# Patient Record
Sex: FEMALE | Race: WHITE | NOT HISPANIC OR LATINO | Employment: OTHER | URBAN - NONMETROPOLITAN AREA
[De-identification: names, ages, dates, MRNs, and addresses within clinical notes are randomized per-mention and may not be internally consistent; named-entity substitution may affect disease eponyms.]

---

## 2017-01-01 ENCOUNTER — HOSPITAL ENCOUNTER (OUTPATIENT)
Dept: CT IMAGING | Facility: HOSPITAL | Age: 72
Discharge: HOME | End: 2017-09-20
Attending: INTERNAL MEDICINE | Admitting: INTERNAL MEDICINE

## 2017-01-01 ENCOUNTER — APPOINTMENT (OUTPATIENT)
Dept: CT IMAGING | Facility: HOSPITAL | Age: 72
End: 2017-01-01
Attending: INTERNAL MEDICINE

## 2017-01-01 ENCOUNTER — HOSPITAL ENCOUNTER (OUTPATIENT)
Dept: CT IMAGING | Facility: HOSPITAL | Age: 72
Discharge: HOME | End: 2017-03-14
Attending: INTERNAL MEDICINE | Admitting: INTERNAL MEDICINE

## 2017-01-01 ENCOUNTER — APPOINTMENT (OUTPATIENT)
Dept: CT IMAGING | Facility: HOSPITAL | Age: 72
End: 2017-01-01

## 2017-01-01 ENCOUNTER — HOSPITAL ENCOUNTER (OUTPATIENT)
Dept: CT IMAGING | Facility: HOSPITAL | Age: 72
Discharge: HOME | End: 2017-01-13
Attending: INTERNAL MEDICINE | Admitting: INTERNAL MEDICINE

## 2017-01-01 ENCOUNTER — OFFICE VISIT (OUTPATIENT)
Dept: ONCOLOGY | Facility: CLINIC | Age: 72
End: 2017-01-01

## 2017-01-01 ENCOUNTER — HOSPITAL ENCOUNTER (OUTPATIENT)
Facility: HOSPITAL | Age: 72
Setting detail: HOSPITAL OUTPATIENT SURGERY
End: 2017-01-01
Attending: SURGERY | Admitting: SURGERY

## 2017-01-01 ENCOUNTER — HOSPITAL ENCOUNTER (OUTPATIENT)
Dept: CT IMAGING | Facility: HOSPITAL | Age: 72
Discharge: HOME | End: 2017-09-20
Attending: INTERNAL MEDICINE

## 2017-01-01 ENCOUNTER — OFFICE VISIT (OUTPATIENT)
Dept: SURGERY | Facility: CLINIC | Age: 72
End: 2017-01-01

## 2017-01-01 ENCOUNTER — HOSPITAL ENCOUNTER (EMERGENCY)
Facility: HOSPITAL | Age: 72
End: 2017-01-01

## 2017-01-01 ENCOUNTER — LAB (OUTPATIENT)
Dept: ONCOLOGY | Facility: CLINIC | Age: 72
End: 2017-01-01

## 2017-01-01 ENCOUNTER — HOSPITAL ENCOUNTER (OUTPATIENT)
Dept: CT IMAGING | Facility: HOSPITAL | Age: 72
Discharge: HOME | End: 2017-01-13
Attending: INTERNAL MEDICINE

## 2017-01-01 ENCOUNTER — DOCUMENTATION (OUTPATIENT)
Dept: ONCOLOGY | Facility: HOSPITAL | Age: 72
End: 2017-01-01

## 2017-01-01 ENCOUNTER — HOSPITAL ENCOUNTER (EMERGENCY)
Facility: HOSPITAL | Age: 72
Discharge: HOME | End: 2017-10-27
Attending: EMERGENCY MEDICINE | Admitting: EMERGENCY MEDICINE

## 2017-01-01 ENCOUNTER — HOSPITAL ENCOUNTER (OUTPATIENT)
Dept: CT IMAGING | Facility: HOSPITAL | Age: 72
Discharge: HOME | End: 2017-06-07
Attending: INTERNAL MEDICINE | Admitting: INTERNAL MEDICINE

## 2017-01-01 ENCOUNTER — HOSPITAL ENCOUNTER (OUTPATIENT)
Dept: CT IMAGING | Facility: HOSPITAL | Age: 72
Discharge: HOME | End: 2017-06-07
Attending: INTERNAL MEDICINE

## 2017-01-01 ENCOUNTER — HOSPITAL ENCOUNTER (INPATIENT)
Facility: HOSPITAL | Age: 72
LOS: 1 days | Discharge: HOSPICE | End: 2017-11-04
Attending: FAMILY MEDICINE | Admitting: FAMILY MEDICINE

## 2017-01-01 ENCOUNTER — HOSPITAL ENCOUNTER (OUTPATIENT)
Dept: CT IMAGING | Facility: HOSPITAL | Age: 72
Discharge: HOME | End: 2017-03-14
Attending: INTERNAL MEDICINE

## 2017-01-01 VITALS
WEIGHT: 135.6 LBS | RESPIRATION RATE: 18 BRPM | HEART RATE: 118 BPM | SYSTOLIC BLOOD PRESSURE: 110 MMHG | BODY MASS INDEX: 26.48 KG/M2 | DIASTOLIC BLOOD PRESSURE: 79 MMHG | TEMPERATURE: 98.2 F | OXYGEN SATURATION: 89 %

## 2017-01-01 VITALS
RESPIRATION RATE: 18 BRPM | OXYGEN SATURATION: 97 % | TEMPERATURE: 97 F | WEIGHT: 151.5 LBS | BODY MASS INDEX: 28.16 KG/M2 | SYSTOLIC BLOOD PRESSURE: 145 MMHG | HEART RATE: 105 BPM | DIASTOLIC BLOOD PRESSURE: 82 MMHG

## 2017-01-01 VITALS
TEMPERATURE: 98.2 F | OXYGEN SATURATION: 99 % | SYSTOLIC BLOOD PRESSURE: 110 MMHG | HEART RATE: 82 BPM | HEIGHT: 63 IN | WEIGHT: 120 LBS | DIASTOLIC BLOOD PRESSURE: 66 MMHG | BODY MASS INDEX: 21.26 KG/M2 | RESPIRATION RATE: 16 BRPM

## 2017-01-01 VITALS
OXYGEN SATURATION: 96 % | TEMPERATURE: 98.2 F | WEIGHT: 136.6 LBS | RESPIRATION RATE: 18 BRPM | BODY MASS INDEX: 25.39 KG/M2 | HEART RATE: 111 BPM | SYSTOLIC BLOOD PRESSURE: 107 MMHG | DIASTOLIC BLOOD PRESSURE: 62 MMHG

## 2017-01-01 VITALS
SYSTOLIC BLOOD PRESSURE: 137 MMHG | WEIGHT: 143 LBS | HEART RATE: 94 BPM | DIASTOLIC BLOOD PRESSURE: 69 MMHG | HEIGHT: 61 IN | BODY MASS INDEX: 27 KG/M2

## 2017-01-01 VITALS
WEIGHT: 143 LBS | SYSTOLIC BLOOD PRESSURE: 146 MMHG | BODY MASS INDEX: 26.58 KG/M2 | RESPIRATION RATE: 18 BRPM | DIASTOLIC BLOOD PRESSURE: 71 MMHG | TEMPERATURE: 99 F | HEART RATE: 95 BPM | OXYGEN SATURATION: 97 %

## 2017-01-01 VITALS
HEART RATE: 103 BPM | DIASTOLIC BLOOD PRESSURE: 68 MMHG | WEIGHT: 146 LBS | TEMPERATURE: 98.6 F | RESPIRATION RATE: 20 BRPM | BODY MASS INDEX: 27.14 KG/M2 | OXYGEN SATURATION: 97 % | SYSTOLIC BLOOD PRESSURE: 111 MMHG

## 2017-01-01 VITALS
RESPIRATION RATE: 16 BRPM | WEIGHT: 145.8 LBS | SYSTOLIC BLOOD PRESSURE: 118 MMHG | DIASTOLIC BLOOD PRESSURE: 68 MMHG | TEMPERATURE: 98.5 F | BODY MASS INDEX: 27.1 KG/M2 | HEART RATE: 116 BPM | OXYGEN SATURATION: 96 %

## 2017-01-01 VITALS
HEIGHT: 60 IN | SYSTOLIC BLOOD PRESSURE: 128 MMHG | BODY MASS INDEX: 28.47 KG/M2 | TEMPERATURE: 98.2 F | DIASTOLIC BLOOD PRESSURE: 73 MMHG | RESPIRATION RATE: 18 BRPM | WEIGHT: 145 LBS | OXYGEN SATURATION: 99 % | HEART RATE: 97 BPM

## 2017-01-01 DIAGNOSIS — C18.9 COLON ADENOCARCINOMA (HCC): ICD-10-CM

## 2017-01-01 DIAGNOSIS — C18.9 MALIGNANT NEOPLASM OF COLON, UNSPECIFIED PART OF COLON (HCC): ICD-10-CM

## 2017-01-01 DIAGNOSIS — C18.9 COLON ADENOCARCINOMA (HCC): Primary | ICD-10-CM

## 2017-01-01 DIAGNOSIS — C18.9 MALIGNANT NEOPLASM OF COLON, UNSPECIFIED PART OF COLON (HCC): Primary | ICD-10-CM

## 2017-01-01 DIAGNOSIS — Z51.5 HOSPICE CARE PATIENT: ICD-10-CM

## 2017-01-01 DIAGNOSIS — N39.0 ACUTE UTI: Primary | ICD-10-CM

## 2017-01-01 LAB
ALBUMIN SERPL-MCNC: 3.8 G/DL (ref 3.4–4.8)
ALBUMIN SERPL-MCNC: 4.1 G/DL (ref 3.4–4.8)
ALBUMIN SERPL-MCNC: 4.1 G/DL (ref 3.4–4.8)
ALBUMIN SERPL-MCNC: 4.7 G/DL (ref 3.4–4.8)
ALBUMIN/GLOB SERPL: 1.1 G/DL (ref 1.5–2.5)
ALBUMIN/GLOB SERPL: 1.2 G/DL (ref 1.5–2.5)
ALBUMIN/GLOB SERPL: 1.3 G/DL (ref 1.5–2.5)
ALBUMIN/GLOB SERPL: 1.4 G/DL (ref 1.5–2.5)
ALP SERPL-CCNC: 67 U/L (ref 35–104)
ALP SERPL-CCNC: 79 U/L (ref 35–104)
ALP SERPL-CCNC: 80 U/L (ref 46–116)
ALP SERPL-CCNC: 86 U/L (ref 35–104)
ALT SERPL W P-5'-P-CCNC: 14 U/L (ref 10–36)
ALT SERPL W P-5'-P-CCNC: 15 U/L (ref 10–36)
ALT SERPL W P-5'-P-CCNC: 20 U/L (ref 10–36)
ALT SERPL W P-5'-P-CCNC: 20 U/L (ref 10–36)
ANION GAP SERPL CALCULATED.3IONS-SCNC: 10.9 MMOL/L (ref 3.6–11.2)
ANION GAP SERPL CALCULATED.3IONS-SCNC: 13.1 MMOL/L (ref 3.6–11.2)
ANION GAP SERPL CALCULATED.3IONS-SCNC: 6.2 MMOL/L (ref 3.6–11.2)
ANION GAP SERPL CALCULATED.3IONS-SCNC: 6.6 MMOL/L (ref 3.6–11.2)
AST SERPL-CCNC: 26 U/L (ref 10–30)
AST SERPL-CCNC: 26 U/L (ref 10–30)
AST SERPL-CCNC: 28 U/L (ref 10–30)
AST SERPL-CCNC: 30 U/L (ref 10–30)
BACTERIA SPEC AEROBE CULT: NORMAL
BACTERIA UR QL AUTO: ABNORMAL /HPF
BASOPHILS # BLD AUTO: 0.03 10*3/MM3 (ref 0–0.3)
BASOPHILS # BLD AUTO: 0.05 10*3/MM3 (ref 0–0.3)
BASOPHILS NFR BLD AUTO: 0.1 % (ref 0–2)
BASOPHILS NFR BLD AUTO: 0.2 % (ref 0–2)
BASOPHILS NFR BLD AUTO: 0.3 % (ref 0–2)
BASOPHILS NFR BLD AUTO: 0.5 % (ref 0–2)
BILIRUB SERPL-MCNC: 0.3 MG/DL (ref 0.2–1.8)
BILIRUB SERPL-MCNC: 0.3 MG/DL (ref 0.2–1.8)
BILIRUB SERPL-MCNC: 0.5 MG/DL (ref 0.2–1.8)
BILIRUB SERPL-MCNC: 0.6 MG/DL (ref 0.2–1.8)
BILIRUB UR QL STRIP: ABNORMAL
BUN BLD-MCNC: 11 MG/DL (ref 7–21)
BUN BLD-MCNC: 15 MG/DL (ref 7–21)
BUN BLD-MCNC: 8 MG/DL (ref 7–21)
BUN BLD-MCNC: 8 MG/DL (ref 7–21)
BUN/CREAT SERPL: 6.9 (ref 7–25)
BUN/CREAT SERPL: 8.6 (ref 7–25)
BUN/CREAT SERPL: 9.3 (ref 7–25)
BUN/CREAT SERPL: 9.6 (ref 7–25)
CALCIUM SPEC-SCNC: 10 MG/DL (ref 7.7–10)
CALCIUM SPEC-SCNC: 10.6 MG/DL (ref 7.7–10)
CALCIUM SPEC-SCNC: 10.9 MG/DL (ref 7.7–10)
CALCIUM SPEC-SCNC: 9.2 MG/DL (ref 7.7–10)
CEA SERPL-MCNC: 1.4 NG/ML (ref 0–5)
CEA SERPL-MCNC: 1.4 NG/ML (ref 0–5)
CEA SERPL-MCNC: 2 NG/ML (ref 0–5)
CHLORIDE SERPL-SCNC: 100 MMOL/L (ref 99–112)
CHLORIDE SERPL-SCNC: 104 MMOL/L (ref 99–112)
CHLORIDE SERPL-SCNC: 106 MMOL/L (ref 99–112)
CHLORIDE SERPL-SCNC: 108 MMOL/L (ref 99–112)
CLARITY UR: ABNORMAL
CO2 SERPL-SCNC: 21.9 MMOL/L (ref 24.3–31.9)
CO2 SERPL-SCNC: 27.8 MMOL/L (ref 24.3–31.9)
CO2 SERPL-SCNC: 28.1 MMOL/L (ref 24.3–31.9)
CO2 SERPL-SCNC: 30.4 MMOL/L (ref 24.3–31.9)
COLOR UR: ABNORMAL
CREAT BLD-MCNC: 0.93 MG/DL (ref 0.43–1.29)
CREAT BLD-MCNC: 1.14 MG/DL (ref 0.43–1.29)
CREAT BLD-MCNC: 1.16 MG/DL (ref 0.43–1.29)
CREAT BLD-MCNC: 1.61 MG/DL (ref 0.43–1.29)
CREAT BLDA-MCNC: 1 MG/DL (ref 0.6–1.3)
CREAT BLDA-MCNC: 1.1 MG/DL (ref 0.6–1.3)
CREAT BLDA-MCNC: 1.1 MG/DL (ref 0.6–1.3)
CREAT BLDA-MCNC: 1.2 MG/DL (ref 0.6–1.3)
D-LACTATE SERPL-SCNC: 2 MMOL/L (ref 0.5–2)
DEPRECATED RDW RBC AUTO: 46.6 FL (ref 37–54)
DEPRECATED RDW RBC AUTO: 49.1 FL (ref 37–54)
DEPRECATED RDW RBC AUTO: 53.3 FL (ref 37–54)
DEPRECATED RDW RBC AUTO: 62.1 FL (ref 37–54)
EOSINOPHIL # BLD AUTO: 0.07 10*3/MM3 (ref 0–0.7)
EOSINOPHIL # BLD AUTO: 0.18 10*3/MM3 (ref 0–0.7)
EOSINOPHIL # BLD AUTO: 0.26 10*3/MM3 (ref 0–0.7)
EOSINOPHIL # BLD AUTO: 0.28 10*3/MM3 (ref 0–0.7)
EOSINOPHIL NFR BLD AUTO: 0.3 % (ref 0–7)
EOSINOPHIL NFR BLD AUTO: 1.8 % (ref 0–7)
EOSINOPHIL NFR BLD AUTO: 2.1 % (ref 0–7)
EOSINOPHIL NFR BLD AUTO: 2.9 % (ref 0–7)
ERYTHROCYTE [DISTWIDTH] IN BLOOD BY AUTOMATED COUNT: 15.7 % (ref 11.5–14.5)
ERYTHROCYTE [DISTWIDTH] IN BLOOD BY AUTOMATED COUNT: 16.1 % (ref 11.5–14.5)
ERYTHROCYTE [DISTWIDTH] IN BLOOD BY AUTOMATED COUNT: 17.8 % (ref 11.5–14.5)
ERYTHROCYTE [DISTWIDTH] IN BLOOD BY AUTOMATED COUNT: 23.1 % (ref 11.5–14.5)
GFR SERPL CREATININE-BSD FRML MDRD: 31 ML/MIN/1.73
GFR SERPL CREATININE-BSD FRML MDRD: 46 ML/MIN/1.73
GFR SERPL CREATININE-BSD FRML MDRD: 47 ML/MIN/1.73
GFR SERPL CREATININE-BSD FRML MDRD: 59 ML/MIN/1.73
GLOBULIN UR ELPH-MCNC: 2.9 GM/DL
GLOBULIN UR ELPH-MCNC: 3.4 GM/DL
GLOBULIN UR ELPH-MCNC: 3.4 GM/DL
GLOBULIN UR ELPH-MCNC: 3.5 GM/DL
GLUCOSE BLD-MCNC: 123 MG/DL (ref 70–110)
GLUCOSE BLD-MCNC: 176 MG/DL (ref 70–110)
GLUCOSE BLD-MCNC: 198 MG/DL (ref 70–110)
GLUCOSE BLD-MCNC: 209 MG/DL (ref 70–110)
GLUCOSE UR STRIP-MCNC: NEGATIVE MG/DL
HCT VFR BLD AUTO: 39 % (ref 37–47)
HCT VFR BLD AUTO: 39.1 % (ref 37–47)
HCT VFR BLD AUTO: 40.8 % (ref 37–47)
HCT VFR BLD AUTO: 41.4 % (ref 37–47)
HGB BLD-MCNC: 12.1 G/DL (ref 12–16)
HGB BLD-MCNC: 12.2 G/DL (ref 12–16)
HGB BLD-MCNC: 12.4 G/DL (ref 12–16)
HGB BLD-MCNC: 12.5 G/DL (ref 12–16)
HGB UR QL STRIP.AUTO: ABNORMAL
HYALINE CASTS UR QL AUTO: ABNORMAL /LPF
IMM GRANULOCYTES # BLD: 0.01 10*3/MM3 (ref 0–0.03)
IMM GRANULOCYTES # BLD: 0.02 10*3/MM3 (ref 0–0.03)
IMM GRANULOCYTES # BLD: 0.02 10*3/MM3 (ref 0–0.03)
IMM GRANULOCYTES # BLD: 0.1 10*3/MM3 (ref 0–0.03)
IMM GRANULOCYTES NFR BLD: 0.1 % (ref 0–0.5)
IMM GRANULOCYTES NFR BLD: 0.2 % (ref 0–0.5)
IMM GRANULOCYTES NFR BLD: 0.2 % (ref 0–0.5)
IMM GRANULOCYTES NFR BLD: 0.4 % (ref 0–0.5)
KETONES UR QL STRIP: ABNORMAL
LEUKOCYTE ESTERASE UR QL STRIP.AUTO: ABNORMAL
LIPASE SERPL-CCNC: 103 U/L (ref 13–60)
LYMPHOCYTES # BLD AUTO: 1.93 10*3/MM3 (ref 1–3)
LYMPHOCYTES # BLD AUTO: 2.67 10*3/MM3 (ref 1–3)
LYMPHOCYTES # BLD AUTO: 2.69 10*3/MM3 (ref 1–3)
LYMPHOCYTES # BLD AUTO: 2.93 10*3/MM3 (ref 1–3)
LYMPHOCYTES NFR BLD AUTO: 24.2 % (ref 16–46)
LYMPHOCYTES NFR BLD AUTO: 26.5 % (ref 16–46)
LYMPHOCYTES NFR BLD AUTO: 27.9 % (ref 16–46)
LYMPHOCYTES NFR BLD AUTO: 8.5 % (ref 16–46)
MCH RBC QN AUTO: 23.3 PG (ref 27–33)
MCH RBC QN AUTO: 25.4 PG (ref 27–33)
MCH RBC QN AUTO: 25.6 PG (ref 27–33)
MCH RBC QN AUTO: 26.3 PG (ref 27–33)
MCHC RBC AUTO-ENTMCNC: 30.2 G/DL (ref 33–37)
MCHC RBC AUTO-ENTMCNC: 30.4 G/DL (ref 33–37)
MCHC RBC AUTO-ENTMCNC: 31 G/DL (ref 33–37)
MCHC RBC AUTO-ENTMCNC: 31.2 G/DL (ref 33–37)
MCV RBC AUTO: 77.1 FL (ref 80–94)
MCV RBC AUTO: 81.8 FL (ref 80–94)
MCV RBC AUTO: 84.1 FL (ref 80–94)
MCV RBC AUTO: 84.4 FL (ref 80–94)
MONOCYTES # BLD AUTO: 0.83 10*3/MM3 (ref 0.1–0.9)
MONOCYTES # BLD AUTO: 0.9 10*3/MM3 (ref 0.1–0.9)
MONOCYTES # BLD AUTO: 1.16 10*3/MM3 (ref 0.1–0.9)
MONOCYTES # BLD AUTO: 2.27 10*3/MM3 (ref 0.1–0.9)
MONOCYTES NFR BLD AUTO: 10.1 % (ref 0–12)
MONOCYTES NFR BLD AUTO: 11.5 % (ref 0–12)
MONOCYTES NFR BLD AUTO: 6.9 % (ref 0–12)
MONOCYTES NFR BLD AUTO: 9.3 % (ref 0–12)
NEUTROPHILS # BLD AUTO: 18.18 10*3/MM3 (ref 1.4–6.5)
NEUTROPHILS # BLD AUTO: 5.71 10*3/MM3 (ref 1.4–6.5)
NEUTROPHILS # BLD AUTO: 6 10*3/MM3 (ref 1.4–6.5)
NEUTROPHILS # BLD AUTO: 8.05 10*3/MM3 (ref 1.4–6.5)
NEUTROPHILS NFR BLD AUTO: 59.4 % (ref 40–75)
NEUTROPHILS NFR BLD AUTO: 59.5 % (ref 40–75)
NEUTROPHILS NFR BLD AUTO: 66.5 % (ref 40–75)
NEUTROPHILS NFR BLD AUTO: 80.6 % (ref 40–75)
NITRITE UR QL STRIP: NEGATIVE
OSMOLALITY SERPL CALC.SUM OF ELEC: 276.5 MOSM/KG (ref 273–305)
OSMOLALITY SERPL CALC.SUM OF ELEC: 282.8 MOSM/KG (ref 273–305)
OSMOLALITY SERPL CALC.SUM OF ELEC: 283.9 MOSM/KG (ref 273–305)
OSMOLALITY SERPL CALC.SUM OF ELEC: 294.2 MOSM/KG (ref 273–305)
PH UR STRIP.AUTO: <=5 [PH] (ref 5–8)
PLATELET # BLD AUTO: 299 10*3/MM3 (ref 130–400)
PLATELET # BLD AUTO: 314 10*3/MM3 (ref 130–400)
PLATELET # BLD AUTO: 343 10*3/MM3 (ref 130–400)
PLATELET # BLD AUTO: 529 10*3/MM3 (ref 130–400)
PMV BLD AUTO: 10.1 FL (ref 6–10)
PMV BLD AUTO: 10.3 FL (ref 6–10)
PMV BLD AUTO: 10.5 FL (ref 6–10)
PMV BLD AUTO: 9.7 FL (ref 6–10)
POTASSIUM BLD-SCNC: 3.5 MMOL/L (ref 3.5–5.3)
POTASSIUM BLD-SCNC: 3.6 MMOL/L (ref 3.5–5.3)
POTASSIUM BLD-SCNC: 3.8 MMOL/L (ref 3.5–5.3)
POTASSIUM BLD-SCNC: 3.9 MMOL/L (ref 3.5–5.3)
PROT SERPL-MCNC: 7 G/DL (ref 6–8)
PROT SERPL-MCNC: 7.2 G/DL (ref 6–8)
PROT SERPL-MCNC: 7.5 G/DL (ref 6–8)
PROT SERPL-MCNC: 8.2 G/DL (ref 6–8)
PROT UR QL STRIP: ABNORMAL
RBC # BLD AUTO: 4.63 10*6/MM3 (ref 4.2–5.4)
RBC # BLD AUTO: 4.77 10*6/MM3 (ref 4.2–5.4)
RBC # BLD AUTO: 4.85 10*6/MM3 (ref 4.2–5.4)
RBC # BLD AUTO: 5.37 10*6/MM3 (ref 4.2–5.4)
RBC # UR: ABNORMAL /HPF
REF LAB TEST METHOD: ABNORMAL
SODIUM BLD-SCNC: 137 MMOL/L (ref 135–153)
SODIUM BLD-SCNC: 139 MMOL/L (ref 135–153)
SODIUM BLD-SCNC: 142 MMOL/L (ref 135–153)
SODIUM BLD-SCNC: 145 MMOL/L (ref 135–153)
SP GR UR STRIP: 1.01 (ref 1–1.03)
SQUAMOUS #/AREA URNS HPF: ABNORMAL /HPF
UROBILINOGEN UR QL STRIP: ABNORMAL
WBC NRBC COR # BLD: 10.08 10*3/MM3 (ref 4.5–12.5)
WBC NRBC COR # BLD: 12.11 10*3/MM3 (ref 4.5–12.5)
WBC NRBC COR # BLD: 22.58 10*3/MM3 (ref 4.5–12.5)
WBC NRBC COR # BLD: 9.63 10*3/MM3 (ref 4.5–12.5)
WBC UR QL AUTO: ABNORMAL /HPF

## 2017-01-01 PROCEDURE — 0 IOPAMIDOL 61 % SOLUTION: Performed by: INTERNAL MEDICINE

## 2017-01-01 PROCEDURE — 74177 CT ABD & PELVIS W/CONTRAST: CPT

## 2017-01-01 PROCEDURE — 74177 CT ABD & PELVIS W/CONTRAST: CPT | Performed by: RADIOLOGY

## 2017-01-01 PROCEDURE — 99214 OFFICE O/P EST MOD 30 MIN: CPT | Performed by: INTERNAL MEDICINE

## 2017-01-01 PROCEDURE — 82565 ASSAY OF CREATININE: CPT

## 2017-01-01 PROCEDURE — 71260 CT THORAX DX C+: CPT

## 2017-01-01 PROCEDURE — 83605 ASSAY OF LACTIC ACID: CPT | Performed by: EMERGENCY MEDICINE

## 2017-01-01 PROCEDURE — 36415 COLL VENOUS BLD VENIPUNCTURE: CPT | Performed by: INTERNAL MEDICINE

## 2017-01-01 PROCEDURE — 82378 CARCINOEMBRYONIC ANTIGEN: CPT | Performed by: INTERNAL MEDICINE

## 2017-01-01 PROCEDURE — 85025 COMPLETE CBC W/AUTO DIFF WBC: CPT | Performed by: EMERGENCY MEDICINE

## 2017-01-01 PROCEDURE — 93005 ELECTROCARDIOGRAM TRACING: CPT | Performed by: PHYSICIAN ASSISTANT

## 2017-01-01 PROCEDURE — 85025 COMPLETE CBC W/AUTO DIFF WBC: CPT | Performed by: INTERNAL MEDICINE

## 2017-01-01 PROCEDURE — 96360 HYDRATION IV INFUSION INIT: CPT

## 2017-01-01 PROCEDURE — 99204 OFFICE O/P NEW MOD 45 MIN: CPT | Performed by: SURGERY

## 2017-01-01 PROCEDURE — 80053 COMPREHEN METABOLIC PANEL: CPT | Performed by: INTERNAL MEDICINE

## 2017-01-01 PROCEDURE — 71260 CT THORAX DX C+: CPT | Performed by: RADIOLOGY

## 2017-01-01 PROCEDURE — 96365 THER/PROPH/DIAG IV INF INIT: CPT

## 2017-01-01 PROCEDURE — 25010000002 CEFTRIAXONE: Performed by: EMERGENCY MEDICINE

## 2017-01-01 PROCEDURE — 96361 HYDRATE IV INFUSION ADD-ON: CPT

## 2017-01-01 PROCEDURE — 99285 EMERGENCY DEPT VISIT HI MDM: CPT

## 2017-01-01 PROCEDURE — 99284 EMERGENCY DEPT VISIT MOD MDM: CPT

## 2017-01-01 PROCEDURE — 70450 CT HEAD/BRAIN W/O DYE: CPT | Performed by: RADIOLOGY

## 2017-01-01 PROCEDURE — 70450 CT HEAD/BRAIN W/O DYE: CPT

## 2017-01-01 PROCEDURE — 80053 COMPREHEN METABOLIC PANEL: CPT | Performed by: EMERGENCY MEDICINE

## 2017-01-01 PROCEDURE — 83690 ASSAY OF LIPASE: CPT | Performed by: EMERGENCY MEDICINE

## 2017-01-01 PROCEDURE — 87040 BLOOD CULTURE FOR BACTERIA: CPT | Performed by: EMERGENCY MEDICINE

## 2017-01-01 PROCEDURE — 81001 URINALYSIS AUTO W/SCOPE: CPT | Performed by: EMERGENCY MEDICINE

## 2017-01-01 RX ORDER — SODIUM, POTASSIUM,MAG SULFATES 17.5-3.13G
1 SOLUTION, RECONSTITUTED, ORAL ORAL EVERY 12 HOURS
Qty: 1 BOTTLE | Refills: 0 | Status: SHIPPED | OUTPATIENT
Start: 2017-01-01

## 2017-01-01 RX ORDER — HYDROCODONE BITARTRATE AND ACETAMINOPHEN 5; 325 MG/1; MG/1
1 TABLET ORAL EVERY 8 HOURS PRN
Qty: 90 TABLET | Refills: 0 | Status: SHIPPED | OUTPATIENT
Start: 2017-01-01

## 2017-01-01 RX ORDER — SULFAMETHOXAZOLE AND TRIMETHOPRIM 800; 160 MG/1; MG/1
1 TABLET ORAL 2 TIMES DAILY
Qty: 14 TABLET | Refills: 0 | Status: SHIPPED | OUTPATIENT
Start: 2017-01-01 | End: 2017-11-11

## 2017-01-01 RX ORDER — SODIUM CHLORIDE 0.9 % (FLUSH) 0.9 %
10 SYRINGE (ML) INJECTION AS NEEDED
Status: DISCONTINUED | OUTPATIENT
Start: 2017-01-01 | End: 2017-01-01 | Stop reason: HOSPADM

## 2017-01-01 RX ORDER — ONDANSETRON 4 MG/1
4 TABLET, ORALLY DISINTEGRATING ORAL EVERY 6 HOURS PRN
Qty: 12 TABLET | Refills: 0 | Status: SHIPPED | OUTPATIENT
Start: 2017-01-01

## 2017-01-01 RX ORDER — SODIUM CHLORIDE 9 MG/ML
75 INJECTION, SOLUTION INTRAVENOUS CONTINUOUS
Status: DISCONTINUED | OUTPATIENT
Start: 2017-01-01 | End: 2017-01-01 | Stop reason: HOSPADM

## 2017-01-01 RX ORDER — MEGESTROL ACETATE 40 MG/ML
400 SUSPENSION ORAL DAILY
Qty: 480 ML | Refills: 3 | Status: SHIPPED | OUTPATIENT
Start: 2017-01-01

## 2017-01-01 RX ORDER — OMEPRAZOLE 10 MG/1
20 CAPSULE, DELAYED RELEASE ORAL DAILY
Qty: 60 CAPSULE | Refills: 5 | Status: SHIPPED | OUTPATIENT
Start: 2017-01-01

## 2017-01-01 RX ORDER — MORPHINE SULFATE 10 MG/5ML
5 SOLUTION ORAL
Status: DISCONTINUED | OUTPATIENT
Start: 2017-01-01 | End: 2017-01-01 | Stop reason: HOSPADM

## 2017-01-01 RX ORDER — MORPHINE SULFATE 20 MG/5ML
5 SOLUTION ORAL
Qty: 36 ML | Refills: 0 | Status: SHIPPED | OUTPATIENT
Start: 2017-01-01

## 2017-01-01 RX ORDER — SULFAMETHOXAZOLE AND TRIMETHOPRIM 800; 160 MG/1; MG/1
1 TABLET ORAL 2 TIMES DAILY
Qty: 14 TABLET | Refills: 0 | Status: SHIPPED | OUTPATIENT
Start: 2017-01-01 | End: 2017-01-01

## 2017-01-01 RX ADMIN — SODIUM CHLORIDE 1000 ML: 9 INJECTION, SOLUTION INTRAVENOUS at 11:24

## 2017-01-01 RX ADMIN — CEFTRIAXONE 1 G: 1 INJECTION, POWDER, FOR SOLUTION INTRAMUSCULAR; INTRAVENOUS at 11:27

## 2017-01-01 RX ADMIN — SODIUM CHLORIDE 75 ML/HR: 9 INJECTION, SOLUTION INTRAVENOUS at 19:30

## 2017-01-01 RX ADMIN — IOPAMIDOL 100 ML: 612 INJECTION, SOLUTION INTRAVENOUS at 11:30

## 2017-01-01 RX ADMIN — IOPAMIDOL 80 ML: 612 INJECTION, SOLUTION INTRAVENOUS at 14:30

## 2017-01-01 RX ADMIN — IOPAMIDOL 80 ML: 612 INJECTION, SOLUTION INTRAVENOUS at 14:15

## 2017-01-01 RX ADMIN — IOPAMIDOL 80 ML: 612 INJECTION, SOLUTION INTRAVENOUS at 13:50

## 2017-01-01 RX ADMIN — MORPHINE SULFATE 5 MG: 10 SOLUTION ORAL at 22:20

## 2017-01-17 NOTE — MR AVS SNAPSHOT
Xiomara Jones   1/17/2017 1:45 PM   Office Visit    Dept Phone:  [phone]   Encounter #:  [id number]    Provider:  DAVID Jaimes MD   Department:  [facility] HEMATOLOGY  AND ONCOLOGY                Your Full Care Plan              Your Updated Medication List          This list is accurate as of: 1/17/17  2:05 PM.  Always use your most recent med list.                aspirin 81 MG EC tablet       citalopram 20 MG tablet   Commonly known as:  CeleXA       colestipol 1 G tablet   Commonly known as:  COLESTID       donepezil 10 MG tablet   Commonly known as:  ARICEPT       isosorbide dinitrate 30 MG tablet   Commonly known as:  ISORDIL       levothyroxine 100 MCG tablet   Commonly known as:  SYNTHROID, LEVOTHROID       memantine 10 MG tablet   Commonly known as:  NAMENDA       metFORMIN  MG 24 hr tablet   Commonly known as:  GLUCOPHAGE-XR       MULTIVITAMIN ADULT PO       pravastatin 40 MG tablet   Commonly known as:  PRAVACHOL       promethazine 6.25 MG/5ML syrup   Commonly known as:  PHENERGAN       vitamin B-12 1000 MCG tablet   Commonly known as:  CYANOCOBALAMIN               We Performed the Following     CBC & Differential     CBC Auto Differential     CEA     Comprehensive Metabolic Panel       You Were Diagnosed With        Codes Comments    Colon adenocarcinoma    -  Primary ICD-10-CM: C18.9  ICD-9-CM: 153.9     Malignant neoplasm of colon, unspecified part of colon     ICD-10-CM: C18.9  ICD-9-CM: 153.9       Instructions     None    Patient Instructions History      Upcoming Appointments     Visit Type Date Time Department    FOLLOW UP 1/17/2017  1:45 PM [facility]    LAB 1/17/2017  2:15 PM [facility]    LAB 3/14/2017  1:15 PM [facility]    FOLLOW UP 3/14/2017  1:45 PM [facility]      MyChart Signup     Our records indicate that you have declined [facility] MyChart signup. If you would like to sign up for MyChart, please email  [email or web address] or call [phone] to obtain an activation code.             Other Info from Your Visit           Your Appointments     Jan 17, 2017  2:15 PM EST   LAB with [facility]RISTIAN[facility] NURSE LAB   [facility] HEMATOLOGY  AND ONCOLOGY ([place])    [address] [place] [id number]   [phone]            Mar 14, 2017  1:15 PM EDT   LAB with CRISTIANE NURSE LAB   [facility] HEMATOLOGY  AND ONCOLOGY ([place])    [address] [place] [address] [place] [address]   [phone]            Mar 14, 2017  1:45 PM EDT   Follow Up with DAVID Jaimes MD   [facility] HEMATOLOGY  AND ONCOLOGY ([place])    [address] [place] [id number]   [phone]           Arrive 15 minutes prior to appointment.              Allergies     No Known Allergies      Vital Signs     Blood Pressure Pulse Temperature Respirations Weight Oxygen Saturation    118/68 116 98.5 °F (36.9 °C) (Oral) 16 145 lb 12.8 oz (66.1 kg) 96%    Body Mass Index Smoking Status                27.1 kg/m2 Former Smoker          Problems and Diagnoses Noted     Cancer of large intestine    Colon cancer          Results     CBC & Differential           CBC Auto Differential      Component Value Standard Range & Units    WBC 9.63 4.50 - 12.50 10*3/mm3    RBC 4.85 4.20 - 5.40 10*6/mm3    Hemoglobin 12.4 12.0 - 16.0 g/dL    Hematocrit 40.8 37.0 - 47.0 %    MCV 84.1 80.0 - 94.0 fL    MCH 25.6 27.0 - 33.0 pg    MCHC 30.4 33.0 - 37.0 g/dL    RDW 17.8 11.5 - 14.5 %    RDW-SD 53.3 37.0 - 54.0 fl    MPV 10.1 6.0 - 10.0 fL    Platelets 314 130 - 400 10*3/mm3    Neutrophil % 59.4 40.0 - 75.0 %    Lymphocyte % 27.9 16.0 - 46.0 %    Monocyte % 9.3 0.0 - 12.0 %    Eosinophil % 2.9 0.0 - 7.0 %    Basophil % 0.3 0.0 - 2.0 %    Immature Grans % 0.2 0.0 - 0.5 %    Neutrophils, Absolute 5.71 1.40 - 6.50 10*3/mm3    Lymphocytes, Absolute 2.69 1.00 - 3.00 10*3/mm3    Monocytes,  Absolute 0.90 0.10 - 0.90 10*3/mm3    Eosinophils, Absolute 0.28 0.00 - 0.70 10*3/mm3    Basophils, Absolute 0.03 0.00 - 0.30 10*3/mm3    Immature Grans, Absolute 0.02 0.00 - 0.03 10*3/mm3

## 2017-01-17 NOTE — PROGRESS NOTES
Name:  Xiomara Jones  :  1945  Date:  2017     REFERRING PHYSICIAN  Marisol Ortiz MD    PRIMARY CARE PHYSICIAN  Marisol Ortiz MD    REASON FOR FOLLOWUP  1. Colon adenocarcinoma    2. Malignant neoplasm of colon, unspecified part of colon      CHIEF COMPLAINT  Occasional dizziness.    Dear Dr. Ortiz,    HISTORY OF PRESENT ILLNESS:   I saw Ms. Jones in follow up in our medical oncology clinic. As you are aware, she is a pleasant, 71 y.o., white female with a history of hypertension, diabetes and hypothyroidism who was diagnosed with a stage IIIC adenocarcinoma of the colon in ~2016. She underwent a successful resection and subsequently consulted with a different oncology office who recommended adjuvant chemotherapy (presumably with a six month course of FOLFOX) at that time. Ultimately, the patient refused this treatment. As she had not seen an oncologist in follow up for the previous six plus months (since her refusal of adjuvant chemotherapy), you referred her to our office in early 2016 to reestablish routine follow up for her history of colon cancer.    INTERIM HISTORY:  Ms. Jones returns to clinic today for followup again accompanied by her daughter. She has some baseline dementia (and the details of the patient's self-reported history are again sporadic), but she and her daughter confirmed the above history. She continues to feel overall well ever since her surgery in 2016 (with no GI symptoms), although she has been having some occasional episodes of dizziness. Her bowel movements and appetite both remain normal.    Past Medical History   Diagnosis Date   • Arthritis    • Colon cancer    • Diabetes mellitus    • Disease of thyroid gland    • Hypertension        Past Surgical History   Procedure Laterality Date   • Hysterectomy     • Cholecystectomy     • Colon surgery     • Colonoscopy     • Kidney stone surgery  2016       Social History     Social History   •  Marital status:      Spouse name: N/A   • Number of children: N/A   • Years of education: N/A     Occupational History   • Not on file.     Social History Main Topics   • Smoking status: Former Smoker     Quit date: 12/6/2001   • Smokeless tobacco: Never Used   • Alcohol use No   • Drug use: No   • Sexual activity: No     Other Topics Concern   • Not on file     Social History Narrative       Family History   Problem Relation Age of Onset   • Cancer Sister        No Known Allergies    Current Outpatient Prescriptions   Medication Sig Dispense Refill   • aspirin 81 MG EC tablet Take 81 mg by mouth Daily.     • citalopram (CeleXA) 20 MG tablet Take 20 mg by mouth Daily.     • colestipol (COLESTID) 1 G tablet Take 1 g by mouth Daily.     • donepezil (ARICEPT) 10 MG tablet Take 10 mg by mouth Every Night.     • isosorbide dinitrate (ISORDIL) 30 MG tablet Take 30 mg by mouth Daily.     • levothyroxine (SYNTHROID, LEVOTHROID) 100 MCG tablet Take 100 mcg by mouth Daily.     • memantine (NAMENDA) 10 MG tablet Take 10 mg by mouth 2 (Two) Times a Day.     • metFORMIN XR (GLUCOPHAGE-XR) 500 MG 24 hr tablet Take 500 mg by mouth Daily With Breakfast.     • Multiple Vitamins-Minerals (MULTIVITAMIN ADULT PO) Take  by mouth.     • pravastatin (PRAVACHOL) 40 MG tablet Take 40 mg by mouth Daily.     • promethazine (PHENERGAN) 6.25 MG/5ML syrup Take  by mouth 4 (Four) Times a Day As Needed for nausea or vomiting.     • vitamin B-12 (CYANOCOBALAMIN) 1000 MCG tablet Take 1,000 mcg by mouth Daily.       No current facility-administered medications for this visit.        REVIEW OF SYSTEMS  CONSTITUTIONAL:  No fever, chills, night sweats or fatigue.  EYES:  No blurry vision, diplopia or other vision changes.  ENT:  No hearing loss, nosebleeds or sore throat.  CARDIOVASCULAR:  No palpitations, arrhythmia or edema. Occasional episodes of dizziness.  PULMONARY:  No hemoptysis, wheezing, chronic cough or shortness of  breath.  GASTROINTESTINAL:  No nausea or vomiting.  No constipation or diarrhea.  No abdominal pain.  GENITOURINARY:  No hematuria, kidney stones or frequent urination.  MUSCULOSKELETAL:  No joint or back pains.  INTEGUMENTARY: No rashes or pruritus.  ENDOCRINE:  No excessive thirst or hot flashes.  HEMATOLOGIC:  No history of free bleeding, spontaneous bleeding or clotting.  IMMUNOLOGIC:  No allergies or frequent infections.  NEUROLOGIC: No numbness, tingling, seizures or weakness.  PSYCHIATRIC:  No anxiety or depression.    PHYSICAL EXAMINATION    Visit Vitals   • /68   • Pulse 116   • Temp 98.5 °F (36.9 °C) (Oral)   • Resp 16   • Wt 145 lb 12.8 oz (66.1 kg)   • SpO2 96%   • BMI 27.1 kg/m2       GENERAL:  A well-developed, well-nourished, pleasantly, mildly demented, white female in no acute distress.  HEENT:  Pupils equally round and reactive to light.  Extraocular muscles intact.  CARDIOVASCULAR:  Regular rate and rhythm.  No murmurs, gallops or rubs.  LUNGS:  Clear to auscultation bilaterally.  ABDOMEN:  Soft, nontender, nondistended with positive bowel sounds.  EXTREMITIES:  No clubbing, cyanosis or edema bilaterally.  SKIN:  No rashes or petechiae.  NEURO:  Cranial nerves grossly intact.  No focal deficits.  PSYCH:  Alert and oriented x3.    LABORATORY    Lab Results   Component Value Date    WBC 9.63 01/17/2017    HGB 12.4 01/17/2017    HCT 40.8 01/17/2017    MCV 84.1 01/17/2017     01/17/2017    NEUTROABS 5.71 01/17/2017       Lab Results   Component Value Date     01/17/2017    K 3.8 01/17/2017     01/17/2017    CO2 28.1 01/17/2017    BUN 11 01/17/2017    CREATININE 1.14 01/17/2017    GLUCOSE 209 (H) 01/17/2017    CALCIUM 10.0 01/17/2017    AST 26 01/17/2017    ALT 20 01/17/2017    ALKPHOS 80 01/17/2017    BILITOT 0.3 01/17/2017    PROTEINTOT 7.0 01/17/2017    ALBUMIN 4.10 01/17/2017     CEA (01/13/2017): 1.40 ng/mL  CEA (12/06/2016): 2.10 ng/mL    IMAGING  CT abdomen and pelvis  stone protocol (04/22/2016):  Impression: Large stone in the right ureter at the pelvic inlet causing marked hydronephrosis. There is also inflammatory change around the cecum of uncertain etiology.    CT chest with contrast (01/13/2017):  Impression: Unremarkable exam. No source for the patient's symptoms.    CT abdomen and pelvis with contrast (01/13/2017):  Impression: Nonspecific mesenteric mass measuring about 2 cm anterior to the 2nd portion of the duodenum not well seen on previous study. Previously identified right lower quadrant mesenteric lymph nodes are not appreciated today. This is of uncertain significance but could represent residual or recurrent disease versus unrelated finding.    PATHOLOGY  Segment of omentum and right colon (04/24/2016):  Portion of colon with attached terminal ileum with invasive moderately differentiated adenocarcinoma of colon with size 3 x 2.5 x 1.5 cm, with invasion through entire thickness of wall of colon to involve pericolonic fat with tumor cells in lymph-vascular spaces, margins of excision free of tumor and areas of diverticular formation. Metastatic adenocarcinoma in 8 out of 14 lymph nodes from pericolonic fat and six areas of discontinuous tumor deposit in pericolonic fat of specimen submitted as right colon. Essentially unremarkable vermiform appendix. Essentially unremarkable fat, no tumor seen, specimen submitted as omentum. [id number].    IMPRESSION AND PLAN  Ms. Jones is a 71 y.o., white female with:  Colon adenocarcinoma: Diagnosed with stage IIIC ([id number]) disease in April 2016, with full thickness invasion of the bowel wall into surrounding pericolonic fat and 8 out of 14 lymph nodes involved. The patient recovered well from the procedure and subsequently met with (through another office) oncology who recommended adjuvant chemotherapy (with, presumably, six months of FOLFOX). The patient ultimately refused this treatment. She was subsequently referred to our  clinic to reestablish routine oncology follow up for this issue. I had a long discussion with the patient and her daughter in clinic at the time of her initial appointment (in early December 2016) regarding her diagnosis and its prognosis. They are aware that her chances of (an eventual) recurrence are significant, and routine monitoring with a combination of periodic symptom checks, CEA levels and imaging is recommended at this time. Therefore, repeat CT scans were performed last week (summarized above); and, while they were overall unremarkable, there is one, ~2 cm mesenteric area near the duodenum that is concerning for a localized recurrence. However, as this area is nonspecific, she remains asymptomatic (from a GI standpoint); and her serum CEA level is still WNL (1.4 ng/mL today), closer follow up is all that is recommended at this time. We will see her back a little earlier than we otherwise would have (in two months rather than three) with repeat scans and labs. The patient and her daughter were in agreement with these plans.    It is a pleasure to participate in Ms. Jones's care. Please do not hesitate to call with any questions or concerns that you may have.    A total of 30 minutes were spent coordinating this patient’s care in clinic today; 25 minutes of which were face-to-face with the patient and her daughter, reviewing her interim medical history, discussing the results of last week's repeat CT scans and counseling on the current followup plan.  All questions were answered to their satisfaction.    FOLLOW UP  Return to clinic in 2 months with a CBC, CMP, CEA and CTs of the chest, abdomen and pelvis with contrast.        This document was electronically signed by DAVID Jaimes MD January 17, 2017 3:09 PM      CC: Marisol Ortiz MD

## 2017-03-14 NOTE — PROGRESS NOTES
Name:  Xiomara Jones  :  1945  Date:  3/14/2017     REFERRING PHYSICIAN  Marisol Ortiz MD    PRIMARY CARE PHYSICIAN  Marisol Ortiz MD    REASON FOR FOLLOWUP  1. Colon adenocarcinoma      CHIEF COMPLAINT  Occasional dizziness.    Dear Dr. Ortiz,    HISTORY OF PRESENT ILLNESS:   I saw Ms. Jones in follow up in our medical oncology clinic. As you are aware, she is a pleasant, 72 y.o., white female with a history of hypertension, diabetes and hypothyroidism who was diagnosed with a stage IIIC adenocarcinoma of the colon in ~2016. She underwent a successful resection and subsequently consulted with a different oncology office who recommended adjuvant chemotherapy (presumably with a six month course) at that time. Ultimately, the patient refused this treatment. As she had not seen an oncologist in follow up for the next six plus months (since her refusal of adjuvant chemotherapy), you referred her to our office in early 2016 to reestablish routine follow up for her history of colon cancer.    INTERIM HISTORY:  Ms. Jones returns to clinic today for followup again accompanied by her daughter. She has some baseline dementia (and the details of the patient's self-reported history are again sporadic). She continues to feel overall well ever since her surgery in 2016 (with no GI symptoms), although she continues to get intermittently dizzy and has been having an increasing tendency to sleep more and more often. Her bowel movements and appetite both remain normal.    Past Medical History   Diagnosis Date   • Arthritis    • Colon cancer    • Diabetes mellitus    • Disease of thyroid gland    • Hypertension        Past Surgical History   Procedure Laterality Date   • Hysterectomy     • Cholecystectomy     • Colon surgery     • Colonoscopy     • Kidney stone surgery  2016       Social History     Social History   • Marital status:      Spouse name: N/A   • Number of children: N/A   •  Years of education: N/A     Occupational History   • Not on file.     Social History Main Topics   • Smoking status: Former Smoker     Quit date: 12/6/2001   • Smokeless tobacco: Never Used   • Alcohol use No   • Drug use: No   • Sexual activity: No     Other Topics Concern   • Not on file     Social History Narrative       Family History   Problem Relation Age of Onset   • Cancer Sister        No Known Allergies    Current Outpatient Prescriptions   Medication Sig Dispense Refill   • aspirin 81 MG EC tablet Take 81 mg by mouth Daily.     • citalopram (CeleXA) 20 MG tablet Take 20 mg by mouth Daily.     • colestipol (COLESTID) 1 G tablet Take 1 g by mouth Daily.     • donepezil (ARICEPT) 10 MG tablet Take 10 mg by mouth Every Night.     • isosorbide dinitrate (ISORDIL) 30 MG tablet Take 30 mg by mouth Daily.     • levothyroxine (SYNTHROID, LEVOTHROID) 100 MCG tablet Take 100 mcg by mouth Daily.     • memantine (NAMENDA) 10 MG tablet Take 10 mg by mouth 2 (Two) Times a Day.     • metFORMIN XR (GLUCOPHAGE-XR) 500 MG 24 hr tablet Take 500 mg by mouth Daily With Breakfast.     • Multiple Vitamins-Minerals (MULTIVITAMIN ADULT PO) Take  by mouth.     • pravastatin (PRAVACHOL) 40 MG tablet Take 40 mg by mouth Daily.     • promethazine (PHENERGAN) 6.25 MG/5ML syrup Take  by mouth 4 (Four) Times a Day As Needed for nausea or vomiting.     • vitamin B-12 (CYANOCOBALAMIN) 1000 MCG tablet Take 1,000 mcg by mouth Daily.       No current facility-administered medications for this visit.        REVIEW OF SYSTEMS  CONSTITUTIONAL:  No fever, chills, night sweats or fatigue. Somnolence, as per the HPI above.  EYES:  No blurry vision, diplopia or other vision changes.  ENT:  No hearing loss, nosebleeds or sore throat.  CARDIOVASCULAR:  No palpitations, arrhythmia or edema. Occasional episodes of dizziness.  PULMONARY:  No hemoptysis, wheezing, chronic cough or shortness of breath.  GASTROINTESTINAL:  No nausea or vomiting.  No  constipation or diarrhea.  No abdominal pain.  GENITOURINARY:  No hematuria, kidney stones or frequent urination.  MUSCULOSKELETAL:  No joint or back pains.  INTEGUMENTARY: No rashes or pruritus.  ENDOCRINE:  No excessive thirst or hot flashes.  HEMATOLOGIC:  No history of free bleeding, spontaneous bleeding or clotting.  IMMUNOLOGIC:  No allergies or frequent infections.  NEUROLOGIC: No numbness, tingling, seizures or weakness.  PSYCHIATRIC:  No anxiety or depression.    PHYSICAL EXAMINATION    Visit Vitals   • /82   • Pulse 105   • Temp 97 °F (36.1 °C) (Oral)   • Resp 18   • Wt 151 lb 8 oz (68.7 kg)   • SpO2 97%   • BMI 28.16 kg/m2       GENERAL:  A well-developed, well-nourished, pleasantly, mildly demented, white female in no acute distress.  HEENT:  Pupils equally round and reactive to light.  Extraocular muscles intact.  CARDIOVASCULAR:  Regular rate and rhythm.  No murmurs, gallops or rubs.  LUNGS:  Clear to auscultation bilaterally.  ABDOMEN:  Soft, nontender, nondistended with positive bowel sounds.  EXTREMITIES:  No clubbing, cyanosis or edema bilaterally.  SKIN:  No rashes or petechiae.  NEURO:  Cranial nerves grossly intact.  No focal deficits.  PSYCH:  Alert and oriented x3.    LABORATORY    Lab Results   Component Value Date    WBC 12.11 03/14/2017    HGB 12.2 03/14/2017    HCT 39.1 03/14/2017    MCV 84.4 03/14/2017     03/14/2017    NEUTROABS 8.05 (H) 03/14/2017       Lab Results   Component Value Date     03/14/2017    K 3.6 03/14/2017     03/14/2017    CO2 30.4 03/14/2017    BUN 8 03/14/2017    CREATININE 1.16 03/14/2017    GLUCOSE 176 (H) 03/14/2017    CALCIUM 9.2 03/14/2017    AST 28 03/14/2017    ALT 14 03/14/2017    ALKPHOS 86 03/14/2017    BILITOT 0.6 03/14/2017    PROTEINTOT 7.2 03/14/2017    ALBUMIN 3.80 03/14/2017     CEA (03/14/2017): 2.0 ng/mL  CEA (01/13/2017): 1.4 ng/mL  CEA (12/06/2016): 2.1 ng/mL    IMAGING  CT abdomen and pelvis stone protocol  (04/22/2016):  Impression: Large stone in the right ureter at the pelvic inlet causing marked hydronephrosis. There is also inflammatory change around the cecum of uncertain etiology.    CT chest with contrast (01/13/2017):  Impression: Unremarkable exam. No source for the patient's symptoms.    CT abdomen and pelvis with contrast (01/13/2017):  Impression: Nonspecific mesenteric mass measuring about 2 cm anterior to the 2nd portion of the duodenum not well seen on previous study. Previously identified right lower quadrant mesenteric lymph nodes are not appreciated today. This is of uncertain significance but could represent residual or recurrent disease versus unrelated finding.    CT chest with contrast (03/14/2017):  Impression: Stable appearance of the chest.    CT abdomen and pelvis with contrast (03/14/2017):  Impression: 3.5 cm right upper quadrant mesenteric fluid collection with enhancing wall that could be infectious or neoplastic (intervally slightly increased in size compared to 01/13/2017).    PATHOLOGY  Segment of omentum and right colon (04/24/2016):  Portion of colon with attached terminal ileum with invasive moderately differentiated adenocarcinoma of colon with size 3 x 2.5 x 1.5 cm, with invasion through entire thickness of wall of colon to involve pericolonic fat with tumor cells in lymph-vascular spaces, margins of excision free of tumor and areas of diverticular formation. Metastatic adenocarcinoma in 8 out of 14 lymph nodes from pericolonic fat and six areas of discontinuous tumor deposit in pericolonic fat of specimen submitted as right colon. Essentially unremarkable vermiform appendix. Essentially unremarkable fat, no tumor seen, specimen submitted as omentum. [id number].    IMPRESSION AND PLAN  Ms. Jones is a 72 y.o., white female with:Colon adenocarcinoma: Diagnosed with stage IIIC ([id number]) disease in April 2016, with full thickness invasion of the bowel wall into surrounding pericolonic  fat and 8 out of 14 lymph nodes involved. The patient recovered well from the procedure and subsequently met with (through another office) oncology who recommended adjuvant chemotherapy (with, presumably, a six month regimen). The patient ultimately refused this treatment. She was subsequently referred to our clinic to reestablish routine oncology follow up for this issue. I had a long discussion with the patient and her daughter in clinic at the time of her initial appointment (in early December 2016) regarding her diagnosis and its prognosis. They are aware that her chances of (an eventual) recurrence are significant, and routine monitoring with a combination of periodic symptom checks, CEA levels and imaging has been recommended. While her most recent repeat serum CEA level remains WNL (2.0 ng/mL today), the most recent repeat CT scans (from this morning, 03/14/2017, summarized above) are increasingly concerning. The solitary, RUQ mesenteric mass has now increased slightly in size (to 3.5 cm) compared to January and is now more consistent with an abscess and/or locally recurrent disease than with an artifact or scar tissue. Fortunately, this continues to be the only abnormal finding; and, despite her early dementia and other medical issues, as she is otherwise still doing overall well, we will refer her back to her surgeon for further evaluation. Unless the results of an intervention warrant an earlier follow up, we will see her back in clinic in three months with repeat labs and scans. The patient and her daughter were in agreement with these plans.    It is a pleasure to participate in Ms. Jones's care. Please do not hesitate to call with any questions or concerns that you may have.    A total of 30 minutes were spent coordinating this patient’s care in clinic today; 25 minutes of which were face-to-face with the patient and her daughter, reviewing her interim medical history, discussing the results of  today's labs and repeat CT scans and counseling on the current evaluation and followup plan.  All questions were answered to their satisfaction.    FOLLOW UP  With general surgery for further evaluation of solitary, mesenteric mass. Return to clinic in 3 months with a CBC, CMP, CEA and CTs of the chest, abdomen and pelvis with contrast.        This document was electronically signed by DAVID Jaimes MD March 14, 2017 2:07 PM      CC: MD Gus Diaz MD

## 2017-06-13 NOTE — PROGRESS NOTES
Name:  Xiomara Jones  :  1945  Date:  2017     REFERRING PHYSICIAN  Marisol Ortiz MD    PRIMARY CARE PHYSICIAN  Marisol Ortiz MD    REASON FOR FOLLOWUP  1. Colon adenocarcinoma      CHIEF COMPLAINT  Intermittent back pain. Decreased appetite.    Dear Dr. Ortiz,    HISTORY OF PRESENT ILLNESS:   I saw Ms. Jones in follow up in our medical oncology clinic. As you are aware, she is a pleasant, 72 y.o., white female with a history of hypertension, diabetes and hypothyroidism who was diagnosed with a stage IIIC adenocarcinoma of the colon in ~2016. She underwent a successful resection and subsequently consulted with a different oncology office who recommended adjuvant chemotherapy (presumably with a six month course) at that time. Ultimately, the patient refused this treatment. As she had not seen an oncologist in follow up for the next six plus months (since her refusal of adjuvant chemotherapy), you referred her to our office in early 2016 to reestablish routine follow up for her history of colon cancer.    INTERIM HISTORY:  Ms. Jones returns to clinic today for followup accompanied by her daughter and graddaughter. She has some baseline dementia (and the details of the patient's self-reported history are again sporadic; her daughter often has to clarify). She continues to feel overall well ever since her surgery in 2016. Her main complaints continue to be of mid-back pain. Starting the omeprazole we prescribed in spring 2017 has helped her reflux. Her appetite, however, has overall been worsening. She continues to have an increasing tendency to sleep more often (which she possibly attributes to boredom since her daughter took her car keys away over one year ago). Her bowel movements and appetite both remain normal. Her surgeon had previously advised against repeat surgery to address her possible localized cancer recurrence, suggesting that doing so might not be work the  limited longterm benefit. She has no other new or specific complaints.    Past Medical History:   Diagnosis Date   • Arthritis    • Colon cancer    • Diabetes mellitus    • Disease of thyroid gland    • Hypertension        Past Surgical History:   Procedure Laterality Date   • CHOLECYSTECTOMY     • COLON SURGERY     • COLONOSCOPY     • HYSTERECTOMY     • KIDNEY STONE SURGERY  2016       Social History     Social History   • Marital status:      Spouse name: N/A   • Number of children: N/A   • Years of education: N/A     Occupational History   • Not on file.     Social History Main Topics   • Smoking status: Former Smoker     Quit date: 12/6/2001   • Smokeless tobacco: Never Used   • Alcohol use No   • Drug use: No   • Sexual activity: No     Other Topics Concern   • Not on file     Social History Narrative       Family History   Problem Relation Age of Onset   • Cancer Sister        No Known Allergies    Current Outpatient Prescriptions   Medication Sig Dispense Refill   • aspirin 81 MG EC tablet Take 81 mg by mouth Daily.     • citalopram (CeleXA) 20 MG tablet Take 20 mg by mouth Daily.     • colestipol (COLESTID) 1 G tablet Take 1 g by mouth Daily.     • donepezil (ARICEPT) 10 MG tablet Take 10 mg by mouth Every Night.     • HYDROcodone-acetaminophen (NORCO) 5-325 MG per tablet Take 1 tablet by mouth Every 8 (Eight) Hours As Needed for Moderate Pain (4-6). 90 tablet 0   • isosorbide dinitrate (ISORDIL) 30 MG tablet Take 30 mg by mouth Daily.     • levothyroxine (SYNTHROID, LEVOTHROID) 100 MCG tablet Take 100 mcg by mouth Daily.     • memantine (NAMENDA) 10 MG tablet Take 10 mg by mouth 2 (Two) Times a Day.     • metFORMIN XR (GLUCOPHAGE-XR) 500 MG 24 hr tablet Take 500 mg by mouth Daily With Breakfast.     • Multiple Vitamins-Minerals (MULTIVITAMIN ADULT PO) Take  by mouth.     • omeprazole (prilOSEC) 10 MG capsule Take 2 capsules by mouth Daily. 60 capsule 5   • pravastatin (PRAVACHOL) 40 MG tablet Take 40  mg by mouth Daily.     • promethazine (PHENERGAN) 6.25 MG/5ML syrup Take  by mouth 4 (Four) Times a Day As Needed for nausea or vomiting.     • vitamin B-12 (CYANOCOBALAMIN) 1000 MCG tablet Take 1,000 mcg by mouth Daily.       No current facility-administered medications for this visit.        REVIEW OF SYSTEMS  CONSTITUTIONAL:  No fever, chills, night sweats or fatigue. Somnolence, as per the HPI above.  EYES:  No blurry vision, diplopia or other vision changes.  ENT:  No hearing loss, nosebleeds or sore throat.  CARDIOVASCULAR:  No palpitations, arrhythmia or edema. Occasional episodes of dizziness.  PULMONARY:  No hemoptysis, wheezing, chronic cough or shortness of breath.  GASTROINTESTINAL:  No nausea or vomiting.  No constipation or diarrhea.  No abdominal pain. Improved reflux since starting a PPI. Decreasing appetite with some mild, ongoing weight loss.  GENITOURINARY:  No hematuria, kidney stones or frequent urination.  MUSCULOSKELETAL:  Worsening mid-back pain for the past couple of weeks.  INTEGUMENTARY: No rashes or pruritus.  ENDOCRINE:  No excessive thirst or hot flashes.  HEMATOLOGIC:  No history of free bleeding, spontaneous bleeding or clotting.  IMMUNOLOGIC:  No allergies or frequent infections.  NEUROLOGIC: No numbness, tingling, seizures or weakness.  PSYCHIATRIC:  Baseline depression, stable on Celexa (which she has been on for years).    PHYSICAL EXAMINATION    /62  Pulse 111  Temp 98.2 °F (36.8 °C) (Oral)   Resp 18  Wt 136 lb 9.6 oz (62 kg)  SpO2 96%  BMI 25.39 kg/m2    GENERAL:  A well-developed, well-nourished, pleasantly, mildly demented, white female in no acute distress.  HEENT:  Pupils equally round and reactive to light.  Extraocular muscles intact.  CARDIOVASCULAR:  Regular rate and rhythm.  No murmurs, gallops or rubs.  LUNGS:  Clear to auscultation bilaterally.  ABDOMEN:  Soft, nondistended with positive bowel sounds.  EXTREMITIES:  No clubbing, cyanosis or edema  bilaterally.  SKIN:  No rashes or petechiae.  NEURO:  Cranial nerves grossly intact.  No focal deficits.  PSYCH:  Alert and oriented x3.    LABORATORY    Lab Results   Component Value Date    WBC 10.08 06/13/2017    HGB 12.1 06/13/2017    HCT 39.0 06/13/2017    MCV 81.8 06/13/2017     06/13/2017    NEUTROABS 6.00 06/13/2017       Lab Results   Component Value Date     06/13/2017    K 3.5 06/13/2017     06/13/2017    CO2 27.8 06/13/2017    BUN 8 06/13/2017    CREATININE 0.93 06/13/2017    GLUCOSE 123 (H) 06/13/2017    CALCIUM 10.6 (H) 06/13/2017    AST 26 06/13/2017    ALT 15 06/13/2017    ALKPHOS 79 06/13/2017    BILITOT 0.3 06/13/2017    PROTEINTOT 7.5 06/13/2017    ALBUMIN 4.10 06/13/2017     CEA (06/13/2017): 1.4 ng/mL  CEA (03/14/2017): 2.0 ng/mL  CEA (01/13/2017): 1.4 ng/mL  CEA (12/06/2016): 2.1 ng/mL    IMAGING  CT abdomen and pelvis stone protocol (04/22/2016):  Impression: Large stone in the right ureter at the pelvic inlet causing marked hydronephrosis. There is also inflammatory change around the cecum of uncertain etiology.    CT chest with contrast (01/13/2017):  Impression: Unremarkable exam. No source for the patient's symptoms.    CT abdomen and pelvis with contrast (01/13/2017):  Impression: Nonspecific mesenteric mass measuring about 2 cm anterior to the 2nd portion of the duodenum not well seen on previous study. Previously identified right lower quadrant mesenteric lymph nodes are not appreciated today. This is of uncertain significance but could represent residual or recurrent disease versus unrelated finding.    CT chest with contrast (03/14/2017):  Impression: Stable appearance of the chest.    CT abdomen and pelvis with contrast (03/14/2017):  Impression: 3.5 cm right upper quadrant mesenteric fluid collection with enhancing wall that could be infectious or neoplastic (intervally slightly increased in size compared to 01/13/2017).    CT chest with contrast  (06/07/2017):  Impression: Stable appearance of the chest.    CT abdomen and pelvis with contrast (06/07/2017):  Impression: Right upper quadrant mesenteric predominantly cystic mass increasing in size from 2.6 to 3.9 cm.    PATHOLOGY  Segment of omentum and right colon (04/24/2016):  Portion of colon with attached terminal ileum with invasive moderately differentiated adenocarcinoma of colon with size 3 x 2.5 x 1.5 cm, with invasion through entire thickness of wall of colon to involve pericolonic fat with tumor cells in lymph-vascular spaces, margins of excision free of tumor and areas of diverticular formation. Metastatic adenocarcinoma in 8 out of 14 lymph nodes from pericolonic fat and six areas of discontinuous tumor deposit in pericolonic fat of specimen submitted as right colon. Essentially unremarkable vermiform appendix. Essentially unremarkable fat, no tumor seen, specimen submitted as omentum. [id number].    IMPRESSION AND PLAN  Ms. Jones is a 72 y.o., white female with:  1. Colon adenocarcinoma: Diagnosed with stage IIIC ([id number]) disease in April 2016, with full thickness invasion of the bowel wall into surrounding pericolonic fat and 8 out of 14 lymph nodes involved. The patient recovered well from the procedure and subsequently met with (through another office) oncology who recommended adjuvant chemotherapy (with, presumably, a six month regimen). The patient ultimately refused this treatment. She was subsequently referred to our clinic to reestablish routine oncology follow up for this issue. I had a long discussion with the patient and her daughter in clinic at the time of her initial appointment (in early December 2016) regarding her diagnosis and its prognosis. They are aware that her chances of (an eventual) recurrence were significant, and routine monitoring with a combination of periodic symptom checks, CEA levels and imaging has been recommended. While her most recent repeat serum CEA level  remains WNL (1.4 ng/mL today), the most recent repeat CT scans (from 06/07/2017, summarized above) are increasingly concerning. The solitary, RUQ mesenteric mass has developed and been increasing slowly but steadily in size (currently to 3.9 cm) since January 2017 and is more consistent with locally recurrent disease than with an artifact or scar tissue. Fortunately, this continues to be the only abnormal finding; and, despite her early dementia and other medical issues, as she is otherwise still doing overall well, we referred her back to her surgeon for further evaluation. They ultimately recommended against attempting a repeat resection on this probable local recurrence (at least for now), stating that the limited longterm benefit likely did not justify the risks. The same argument can currently still be made for palliative radiation and/or systemic chemotherapy. We will therefore continue to address her acute symptoms (see below) and see her back in clinic in three months with a repeat CBC, CMP, CEA and CT scans.  2. Reflux: Currently improved since starting the omeprazole provided at her previous clinic appointment (in early May 2017). Continue to monitor.  3. Protein calorie malnutrition: The patient's poor appetite is more likely to be related to her dementia and/or a decreased taste sensation than it is to issue #1. A Rx for Megace was offered today, but the patient and her family wish to defer for now. Continue to monitor.  4. Pain: In the mid-back and possibly at least partially secondary to a localized recurrence of issue #1 (see above). Continue Norco 5/325 mg q8hr prn was provided today. Continue to monitor.  The patient, her daughter and her granddaughter were in agreement with these plans.    It is a pleasure to participate in Ms. Jones's care. Please do not hesitate to call with any questions or concerns that you may have.    A total of 30 minutes were spent coordinating this patient’s care in  clinic today; 25 minutes of which were face-to-face with the patient, her daughter and her granddaughter, reviewing her interim medical history, discussing the results of last week's CT scans and counseling on the current followup plan. All questions were answered to their satisfaction.    FOLLOW UP  Return to clinic in 3 months with a CBC, CMP, CEA and CTs of the chest, abdomen and pelvis with contrast.        This document was electronically signed by DAVID Jaimes MD June 13, 2017 2:29 PM      CC: MD Gus Diaz MD

## 2017-09-22 NOTE — PROGRESS NOTES
Name:  Xiomara Jones  :  1945  Date:  2017     REFERRING PHYSICIAN  Marisol Ortiz MD    PRIMARY CARE PHYSICIAN  Marisol Ortiz MD    REASON FOR FOLLOWUP  1. Colon adenocarcinoma      CHIEF COMPLAINT  Intermittent back pain and decreased appetite, both stable.    Dear Dr. Ortiz,    HISTORY OF PRESENT ILLNESS:   I saw Ms. Jones in follow up in our medical oncology clinic. As you are aware, she is a pleasant, 72 y.o., white female with a history of hypertension, diabetes and hypothyroidism who was diagnosed with a stage IIIC adenocarcinoma of the colon in ~2016. She underwent a successful resection and subsequently consulted with a different oncology office who recommended adjuvant chemotherapy (presumably with a six month course) at that time. Ultimately, the patient refused this treatment. As she had not seen an oncologist in follow up for the next six plus months (since her refusal of adjuvant chemotherapy), you referred her to our office in early 2016 to reestablish routine follow up for her history of colon cancer.    INTERIM HISTORY:  Ms. Jones returns to clinic today for followup again accompanied by her daughter. She has some baseline dementia (and the details of the patient's self-reported history are again sporadic; her daughter often has to clarify). She continues to feel overall well ever since her surgery in 2016. Her main complaints continue to be of mid-back pain. Starting the omeprazole we prescribed in spring 2017 has helped her reflux. Her appetite, however, has overall been worsening (although it is currently fairly stable). She continues to have an increasing tendency to sleep more often (which she possibly attributes to boredom since her daughter took her car keys away over one year ago). Her bowel movements and appetite both remain normal (she has recently restarted a stool softener). Her surgeon had previously advised against repeat surgery to address  her possible localized cancer recurrence, suggesting that the risks of doing so might not be worth the limited longterm benefit. She again has no other new or specific complaints.    Past Medical History:   Diagnosis Date   • Arthritis    • Colon cancer    • Diabetes mellitus    • Disease of thyroid gland    • Hypertension        Past Surgical History:   Procedure Laterality Date   • CHOLECYSTECTOMY     • COLON SURGERY     • COLONOSCOPY     • HYSTERECTOMY     • KIDNEY STONE SURGERY  2016       Social History     Social History   • Marital status:      Spouse name: N/A   • Number of children: N/A   • Years of education: N/A     Occupational History   • Not on file.     Social History Main Topics   • Smoking status: Former Smoker     Quit date: 12/6/2001   • Smokeless tobacco: Never Used   • Alcohol use No   • Drug use: No   • Sexual activity: No     Other Topics Concern   • Not on file     Social History Narrative       Family History   Problem Relation Age of Onset   • Cancer Sister        No Known Allergies    Current Outpatient Prescriptions   Medication Sig Dispense Refill   • aspirin 81 MG EC tablet Take 81 mg by mouth Daily.     • citalopram (CeleXA) 20 MG tablet Take 20 mg by mouth Daily.     • colestipol (COLESTID) 1 G tablet Take 1 g by mouth Daily.     • donepezil (ARICEPT) 10 MG tablet Take 10 mg by mouth Every Night.     • HYDROcodone-acetaminophen (NORCO) 5-325 MG per tablet Take 1 tablet by mouth Every 8 (Eight) Hours As Needed for Moderate Pain (4-6). 90 tablet 0   • isosorbide dinitrate (ISORDIL) 30 MG tablet Take 30 mg by mouth Daily.     • levothyroxine (SYNTHROID, LEVOTHROID) 100 MCG tablet Take 100 mcg by mouth Daily.     • megestrol (MEGACE) 40 MG/ML suspension Take 10 mL by mouth Daily. 480 mL 3   • memantine (NAMENDA) 10 MG tablet Take 10 mg by mouth 2 (Two) Times a Day.     • metFORMIN XR (GLUCOPHAGE-XR) 500 MG 24 hr tablet Take 500 mg by mouth Daily With Breakfast.     • Multiple  Vitamins-Minerals (MULTIVITAMIN ADULT PO) Take  by mouth.     • omeprazole (prilOSEC) 10 MG capsule Take 2 capsules by mouth Daily. 60 capsule 5   • pravastatin (PRAVACHOL) 40 MG tablet Take 40 mg by mouth Daily.     • promethazine (PHENERGAN) 6.25 MG/5ML syrup Take  by mouth 4 (Four) Times a Day As Needed for nausea or vomiting.     • vitamin B-12 (CYANOCOBALAMIN) 1000 MCG tablet Take 1,000 mcg by mouth Daily.       No current facility-administered medications for this visit.        REVIEW OF SYSTEMS  CONSTITUTIONAL:  No fever, chills, night sweats or fatigue. Somnolence, as per the HPI above.  EYES:  No blurry vision, diplopia or other vision changes.  ENT:  No hearing loss, nosebleeds or sore throat.  CARDIOVASCULAR:  No palpitations, arrhythmia or edema. Occasional episodes of dizziness, stable.  PULMONARY:  No hemoptysis, wheezing, chronic cough or shortness of breath.  GASTROINTESTINAL:  No nausea or vomiting.  No constipation or diarrhea.  No abdominal pain. Improved reflux since starting a PPI. Decreasing appetite with some mild, ongoing weight loss, currently fairly stable.  GENITOURINARY:  No hematuria, kidney stones or frequent urination.  MUSCULOSKELETAL:  Worsening mid-back pain compared to last year, currently stable.  INTEGUMENTARY: No rashes or pruritus.  ENDOCRINE:  No excessive thirst or hot flashes.  HEMATOLOGIC:  No history of free bleeding, spontaneous bleeding or clotting.  IMMUNOLOGIC:  No allergies or frequent infections.  NEUROLOGIC: No numbness, tingling, seizures or weakness.  PSYCHIATRIC:  Baseline depression, stable on Celexa (which she has been on for years).    PHYSICAL EXAMINATION    /71  Pulse 95  Temp 99 °F (37.2 °C) (Oral)   Resp 18  Wt 143 lb (64.9 kg)  SpO2 97%  BMI 26.58 kg/m2    GENERAL:  A well-developed, well-nourished, pleasantly, mildly demented, white female in no acute distress.  HEENT:  Pupils equally round and reactive to light.  Extraocular muscles  intact.  CARDIOVASCULAR:  Regular rate and rhythm.  No murmurs, gallops or rubs.  LUNGS:  Clear to auscultation bilaterally.  ABDOMEN:  Soft, nondistended with positive bowel sounds.  EXTREMITIES:  No clubbing, cyanosis or edema bilaterally.  SKIN:  No rashes or petechiae.  NEURO:  Cranial nerves grossly intact.  No focal deficits.  PSYCH:  Alert and oriented x3.    LABORATORY    Lab Results   Component Value Date    WBC 10.08 06/13/2017    HGB 12.1 06/13/2017    HCT 39.0 06/13/2017    MCV 81.8 06/13/2017     06/13/2017    NEUTROABS 6.00 06/13/2017       Lab Results   Component Value Date     06/13/2017    K 3.5 06/13/2017     06/13/2017    CO2 27.8 06/13/2017    BUN 8 06/13/2017    CREATININE 1.10 09/20/2017    GLUCOSE 123 (H) 06/13/2017    CALCIUM 10.6 (H) 06/13/2017    AST 26 06/13/2017    ALT 15 06/13/2017    ALKPHOS 79 06/13/2017    BILITOT 0.3 06/13/2017    PROTEINTOT 7.5 06/13/2017    ALBUMIN 4.10 06/13/2017     CEA (06/13/2017): 1.4 ng/mL  CEA (03/14/2017): 2.0 ng/mL  CEA (01/13/2017): 1.4 ng/mL  CEA (12/06/2016): 2.1 ng/mL    IMAGING  CT abdomen and pelvis stone protocol (04/22/2016):  Impression: Large stone in the right ureter at the pelvic inlet causing marked hydronephrosis. There is also inflammatory change around the cecum of uncertain etiology.    CT chest with contrast (01/13/2017):  Impression: Unremarkable exam. No source for the patient's symptoms.    CT abdomen and pelvis with contrast (01/13/2017):  Impression: Nonspecific mesenteric mass measuring about 2 cm anterior to the 2nd portion of the duodenum not well seen on previous study. Previously identified right lower quadrant mesenteric lymph nodes are not appreciated today. This is of uncertain significance but could represent residual or recurrent disease versus unrelated finding.    CT chest with contrast (03/14/2017):  Impression: Stable appearance of the chest.    CT abdomen and pelvis with contrast  (03/14/2017):  Impression: 3.5 cm right upper quadrant mesenteric fluid collection with enhancing wall that could be infectious or neoplastic (intervally slightly increased in size compared to 01/13/2017).    CT chest with contrast (06/07/2017):  Impression: Stable appearance of the chest.    CT abdomen and pelvis with contrast (06/07/2017):  Impression: Right upper quadrant mesenteric predominantly cystic mass increasing in size from 2.6 to 3.9 cm.    CT chest with contrast (09/20/2017):  Impression:  1) Unremarkable exam. No source for the patient's symptoms.  2) Other incidental/non-acute findings as above [see report].    CT abdomen and pelvis with contrast (09/20/2017, compared to 06/07/2017):  Impression: Increased size of mesenteric mass. It was 4.5 cm and is now 6.3 cm.    PATHOLOGY  Segment of omentum and right colon (04/24/2016):  Portion of colon with attached terminal ileum with invasive moderately differentiated adenocarcinoma of colon with size 3 x 2.5 x 1.5 cm, with invasion through entire thickness of wall of colon to involve pericolonic fat with tumor cells in lymph-vascular spaces, margins of excision free of tumor and areas of diverticular formation. Metastatic adenocarcinoma in 8 out of 14 lymph nodes from pericolonic fat and six areas of discontinuous tumor deposit in pericolonic fat of specimen submitted as right colon. Essentially unremarkable vermiform appendix. Essentially unremarkable fat, no tumor seen, specimen submitted as omentum. [id number].    IMPRESSION AND PLAN  Ms. Jones is a 72 y.o., white female with:  1. Colon adenocarcinoma: Diagnosed with stage IIIC ([id number]) disease in April 2016, with full thickness invasion of the bowel wall into surrounding pericolonic fat and 8 out of 14 lymph nodes involved. The patient recovered well from the procedure and subsequently met with (through another office) oncology who recommended adjuvant chemotherapy (with, presumably, a six month  regimen). The patient ultimately refused this treatment. She was subsequently referred to our clinic to reestablish routine oncology follow up for this issue. I had a long discussion with the patient and her daughter in clinic at the time of her initial appointment (in early December 2016) regarding her diagnosis and its prognosis. They are aware that her chances of (an eventual) recurrence were significant, and routine monitoring with a combination of periodic symptom checks, CEA levels and imaging was recommended. While her most recent repeat serum CEA level remains WNL (1.4 ng/mL in early June 2017), the most recent repeat CT scans (from 09/20/2017, summarized above) are increasingly concerning. The solitary, RUQ mesenteric mass has developed and been increasing slowly but steadily in size (currently to ~6.3 cm) since January 2017 and is now much more consistent with locally recurrent disease than with an artifact or scar tissue. Fortunately, this continues to be the only abnormal finding; and, despite her early dementia and other medical issues, as she is otherwise still doing overall well, we referred her back to her surgeon in early summer 2017 for further evaluation. They ultimately recommended against attempting a repeat resection on this probable local recurrence (at least for now), stating that the limited longterm benefit likely did not justify the risks. However, at this time, as this continues to be the only active site on imaging and will likely eventually cause active problems (bowel obstruction, etc.), she is now agreeable to being referred to radiation oncology for an opinion regarding the potential risks vs. benefits of a course of localized XRT. We will see her back in our clinic in three months with a repeat CBC, CMP, CEA and CT scans.  2. Reflux: Currently still improved since starting the omeprazole provided in early May 2017. Continue to monitor.  3. Protein calorie malnutrition: The patient's  poor appetite is still more likely to be related to her dementia and/or a decreased taste sensation than it is to issue #1. A Rx for Megace was previously offered, but the patient and her family wish to continue to defer this for now. Continue to monitor.  4. Pain: In the mid-back and possibly at least partially secondary to a localized recurrence of issue #1 (see above). Continue Norco 5/325 mg q8hr prn (which she has needed very infrequently). Continue to monitor.  The patient and her daughter were in agreement with these plans.    It is a pleasure to participate in Ms. Jones's care. Please do not hesitate to call with any questions or concerns that you may have.    A total of 30 minutes were spent coordinating this patient’s care in clinic today; 25 minutes of which were face-to-face with the patient and her daughter, reviewing her interim medical history, discussing the results of this week's repeat CT scans and counseling on the current evaluation, treatment and followup plan. All questions were answered to their satisfaction.    FOLLOW UP  Referral made to radiation oncology for an evaluation regarding possible localized XRT. Return to our clinic in 3 months with a CBC, CMP, CEA and CTs of the chest, abdomen and pelvis with contrast.        This document was electronically signed by DAVID Jaimes MD September 22, 2017 3:25 PM      CC: MD Brett Diaz MD, PhD   Gus Vasquez MD

## 2017-10-20 NOTE — PROGRESS NOTES
"Cheikh Jones is a 72 y.o. female is being seen for consultation today at the request of Marisol Ortiz MD    HPI Comments: 73 yo F  History of R colon cancer Stage III s/p right colectomy with 8/16 nodes positive.    Pt. Refused chemo  Now with mesenteric local recurrence and melena.  Colonoscopy last year was negative.    On CT mass abuts the SMV and a distal branch of the SMA.        Past Medical History:   Diagnosis Date   • Arthritis    • Colon cancer    • Diabetes mellitus    • Disease of thyroid gland    • Hypertension        Family History   Problem Relation Age of Onset   • Cancer Sister        Social History     Social History   • Marital status:      Spouse name: N/A   • Number of children: N/A   • Years of education: N/A     Occupational History   • Not on file.     Social History Main Topics   • Smoking status: Former Smoker     Quit date: 12/6/2001   • Smokeless tobacco: Never Used   • Alcohol use No   • Drug use: No   • Sexual activity: No     Other Topics Concern   • Not on file     Social History Narrative       Past Surgical History:   Procedure Laterality Date   • CHOLECYSTECTOMY     • COLON SURGERY     • COLONOSCOPY     • HYSTERECTOMY     • KIDNEY STONE SURGERY  2016       The following portions of the patient's history were reviewed and updated as appropriate: allergies, current medications, past family history, past medical history, past social history, past surgical history and problem list.    Review of Systems   Unable to perform ROS: Dementia         /69  Pulse 94  Ht 61\" (154.9 cm)  Wt 143 lb (64.9 kg)  LMP  (LMP Unknown)  BMI 27.02 kg/m2  Objective   Physical Exam   Constitutional: She is oriented to person, place, and time. She appears well-developed.   Demented.   HENT:   Head: Normocephalic and atraumatic.   Mouth/Throat: Mucous membranes are normal.   Eyes: Conjunctivae are normal. Pupils are equal, round, and reactive to light.   Neck: Neck supple. No JVD " present. No tracheal deviation present. No thyromegaly present.   Cardiovascular: Normal rate and regular rhythm.  Exam reveals no gallop and no friction rub.    No murmur heard.  Pulmonary/Chest: Effort normal and breath sounds normal.   Abdominal: Soft. She exhibits no distension. There is no splenomegaly or hepatomegaly. There is no tenderness. No hernia.   Musculoskeletal: Normal range of motion. She exhibits no deformity.   Neurological: She is alert and oriented to person, place, and time.   Skin: Skin is warm and dry.   Psychiatric: She has a normal mood and affect.   Vitals reviewed.        Xiomara was seen today for colon cancer.    Diagnoses and all orders for this visit:    Colon adenocarcinoma  -     Case Request; Standing  -     Case Request    Other orders  -     Follow anesthesia standing orders.  -     Follow anesthesia standing orders.; Standing  -     Verify NPO Status; Standing  -     Obtain informed consent; Standing  -     SCD (sequential compression device)- to be placed on patient in Pre-op; Standing  -     sodium-potassium-magnesium sulfates (SUPREP BOWEL PREP KIT) 17.5-3.13-1.6 GM/180ML solution oral solution; Take 1 bottle by mouth Every 12 (Twelve) Hours.        Assessment     71 yo F with mesenteric local recurrence of Stage III colon cancer.  She has a 6cm mass of the mesentery of the R colon abutting the SMV and a distal branch of the SMA.  She is anemic with melanic stools.  I will perform colonoscopy to determine if mass is source of blood loss.

## 2017-10-27 NOTE — ED NOTES
Pt unable to provide sample at this time pt assisted to the bathroom with daughter would try again soon     Rufina Riggs  10/27/17 1038

## 2017-10-27 NOTE — ED PROVIDER NOTES
Subjective   Patient is a 72 y.o. female presenting with abdominal pain.   History provided by:  Patient  Abdominal Pain   Pain location:  Suprapubic  Pain quality: aching and pressure    Pain severity:  Mild  Progression:  Worsening  Chronicity:  New  Context: not awakening from sleep, not medication withdrawal and not previous surgeries    Relieved by:  Nothing  Worsened by:  Nothing  Associated symptoms: anorexia, dysuria and vomiting    Associated symptoms: no chest pain and no fever        Review of Systems   Constitutional: Negative.  Negative for fever.   HENT: Negative.    Respiratory: Negative.    Cardiovascular: Negative.  Negative for chest pain.   Gastrointestinal: Positive for abdominal pain, anorexia and vomiting.   Endocrine: Negative.    Genitourinary: Positive for dysuria.   Skin: Negative.    Neurological: Negative.    Psychiatric/Behavioral: Negative.    All other systems reviewed and are negative.      Past Medical History:   Diagnosis Date   • Arthritis    • Colon cancer    • Diabetes mellitus    • Disease of thyroid gland    • Hypertension        No Known Allergies    Past Surgical History:   Procedure Laterality Date   • CHOLECYSTECTOMY     • COLON SURGERY     • COLONOSCOPY     • HYSTERECTOMY     • KIDNEY STONE SURGERY  2016       Family History   Problem Relation Age of Onset   • Cancer Sister        Social History     Social History   • Marital status:      Spouse name: N/A   • Number of children: N/A   • Years of education: N/A     Social History Main Topics   • Smoking status: Former Smoker     Quit date: 12/6/2001   • Smokeless tobacco: Never Used   • Alcohol use No   • Drug use: No   • Sexual activity: No     Other Topics Concern   • None     Social History Narrative           Objective   Physical Exam   Constitutional: She is oriented to person, place, and time. She appears well-developed and well-nourished. No distress.   HENT:   Head: Normocephalic and atraumatic.   Right Ear:  External ear normal.   Left Ear: External ear normal.   Nose: Nose normal.   Eyes: Conjunctivae and EOM are normal. Pupils are equal, round, and reactive to light.   Neck: Normal range of motion. Neck supple. No JVD present. No tracheal deviation present.   Cardiovascular: Normal rate, regular rhythm and normal heart sounds.    No murmur heard.  Pulmonary/Chest: Effort normal and breath sounds normal. No respiratory distress. She has no wheezes.   Abdominal: Soft. Bowel sounds are normal. There is no tenderness.   Musculoskeletal: Normal range of motion. She exhibits no edema or deformity.   Neurological: She is alert and oriented to person, place, and time. No cranial nerve deficit.   Skin: Skin is warm and dry. No rash noted. She is not diaphoretic. No erythema. No pallor.   Psychiatric: She has a normal mood and affect. Her behavior is normal. Thought content normal.   Nursing note and vitals reviewed.      Procedures         ED Course  ED Course                  MDM    Final diagnoses:   Acute UTI            Juan Carlos Lentz MD  10/27/17 [id number]

## 2017-10-27 NOTE — ED NOTES
Discharge instructions reviewed with patient, patient instructed to return to ED if symptoms worsen or if any new problems arise. Patient verbalizes understanding of discharge instructions, patient ambulatory out of ED. No acute distress noted.       Francine Sarmiento RN  10/27/17 [id number]

## 2017-11-01 NOTE — PROGRESS NOTES
Name:  Xiomara Jones  :  1945  Date:  2017     REFERRING PHYSICIAN  Marisol Ortiz MD    PRIMARY CARE PHYSICIAN  Marisol Ortiz MD    REASON FOR FOLLOWUP  1. Colon adenocarcinoma      CHIEF COMPLAINT  Intermittent back pain and decreased appetite. Worsening dementia.    Dear Dr. Ortiz,    HISTORY OF PRESENT ILLNESS:   I saw Ms. Jones in follow up in our medical oncology clinic. As you are aware, she is a pleasant, 72 y.o., white female with a history of hypertension, diabetes and hypothyroidism who was diagnosed with a stage IIIC adenocarcinoma of the colon in ~2016. She underwent a successful resection and subsequently consulted with a different oncology office who recommended adjuvant chemotherapy (presumably with a six month course) at that time. Ultimately, the patient refused this treatment. As she had not seen an oncologist in follow up for the next six plus months (since her refusal of adjuvant chemotherapy), you referred her to our office in early 2016 to reestablish routine follow up for her history of colon cancer.    INTERIM HISTORY:  Ms. Jones returns to clinic today for followup again accompanied by her daughter. Her dementia has been steadily progressing in recent months, and the daughter provided the majority of her interval history today. Her surgeon had previously advised against repeat surgery to address her possible localized cancer recurrence, suggesting that the risks of doing so might not be worth the limited longterm benefit. She has recently been experiencing progressive abdominal pain, constipation, occasional vomiting (which has recently prompted trips to the ER to rule out obstruction) and intermittent GI bleeding.    Past Medical History:   Diagnosis Date   • Arthritis    • Colon cancer    • Diabetes mellitus    • Disease of thyroid gland    • Hypertension        Past Surgical History:   Procedure Laterality Date   • CHOLECYSTECTOMY     • COLON  SURGERY     • COLONOSCOPY     • HYSTERECTOMY     • KIDNEY STONE SURGERY  2016       Social History     Social History   • Marital status:      Spouse name: N/A   • Number of children: N/A   • Years of education: N/A     Occupational History   • Not on file.     Social History Main Topics   • Smoking status: Former Smoker     Quit date: 12/6/2001   • Smokeless tobacco: Never Used   • Alcohol use No   • Drug use: No   • Sexual activity: No     Other Topics Concern   • Not on file     Social History Narrative       Family History   Problem Relation Age of Onset   • Cancer Sister        No Known Allergies    Current Outpatient Prescriptions   Medication Sig Dispense Refill   • aspirin 81 MG EC tablet Take 81 mg by mouth Daily.     • citalopram (CeleXA) 20 MG tablet Take 20 mg by mouth Daily.     • colestipol (COLESTID) 1 G tablet Take 1 g by mouth Daily.     • donepezil (ARICEPT) 10 MG tablet Take 10 mg by mouth Every Night.     • HYDROcodone-acetaminophen (NORCO) 5-325 MG per tablet Take 1 tablet by mouth Every 8 (Eight) Hours As Needed for Moderate Pain (4-6). 90 tablet 0   • isosorbide dinitrate (ISORDIL) 30 MG tablet Take 30 mg by mouth Daily.     • levothyroxine (SYNTHROID, LEVOTHROID) 100 MCG tablet Take 100 mcg by mouth Daily.     • megestrol (MEGACE) 40 MG/ML suspension Take 10 mL by mouth Daily. 480 mL 3   • memantine (NAMENDA) 10 MG tablet Take 10 mg by mouth 2 (Two) Times a Day.     • metFORMIN XR (GLUCOPHAGE-XR) 500 MG 24 hr tablet Take 500 mg by mouth Daily With Breakfast.     • Multiple Vitamins-Minerals (MULTIVITAMIN ADULT PO) Take  by mouth.     • omeprazole (prilOSEC) 10 MG capsule Take 2 capsules by mouth Daily. 60 capsule 5   • ondansetron ODT (ZOFRAN-ODT) 4 MG disintegrating tablet Take 1 tablet by mouth Every 6 (Six) Hours As Needed for Nausea or Vomiting. 12 tablet 0   • pravastatin (PRAVACHOL) 40 MG tablet Take 40 mg by mouth Daily.     • promethazine (PHENERGAN) 6.25 MG/5ML syrup Take  by  mouth 4 (Four) Times a Day As Needed for nausea or vomiting.     • sodium-potassium-magnesium sulfates (SUPREP BOWEL PREP KIT) 17.5-3.13-1.6 GM/180ML solution oral solution Take 1 bottle by mouth Every 12 (Twelve) Hours. 1 bottle 0   • sulfamethoxazole-trimethoprim (BACTRIM DS,SEPTRA DS) 800-160 MG per tablet Take 1 tablet by mouth 2 (Two) Times a Day for 7 days. 14 tablet 0   • vitamin B-12 (CYANOCOBALAMIN) 1000 MCG tablet Take 1,000 mcg by mouth Daily.       No current facility-administered medications for this visit.        REVIEW OF SYSTEMS  CONSTITUTIONAL:  No fever, chills, night sweats or fatigue. Somnolence.  EYES:  No blurry vision, diplopia or other vision changes.  ENT:  No hearing loss, nosebleeds or sore throat.  CARDIOVASCULAR:  No palpitations, arrhythmia or edema. Occasional episodes of dizziness.  PULMONARY:  No hemoptysis, wheezing, chronic cough or shortness of breath.  GASTROINTESTINAL:  Occasional vomiting. Chronic constipation. Progressive abdominal pains, as per the HPI above. Decreasing appetite with some mild, ongoing weight loss.  GENITOURINARY:  No hematuria, kidney stones or frequent urination.  MUSCULOSKELETAL:  Worsening mid-back pain compared to last year, currently stable.  INTEGUMENTARY: No rashes or pruritus.  ENDOCRINE:  No excessive thirst or hot flashes.  HEMATOLOGIC:  No history of free bleeding, spontaneous bleeding or clotting.  IMMUNOLOGIC:  No allergies or frequent infections.  NEUROLOGIC: No numbness, tingling, seizures or weakness.  PSYCHIATRIC:  Baseline depression, stable on Celexa (which she has been on for years).    PHYSICAL EXAMINATION    /79  Pulse 118  Temp 98.2 °F (36.8 °C) (Oral)   Resp 18  Wt 135 lb 9.6 oz (61.5 kg)  LMP  (LMP Unknown)  SpO2 (!) 89%  BMI 26.48 kg/m2    GENERAL:  A well-developed, well-nourished, pleasantly, mildly demented, white female in no acute distress, sitting in a wheelchair.  HEENT:  Pupils equally round and reactive to  light.  Extraocular muscles intact.  CARDIOVASCULAR:  Regular rate and rhythm.  No murmurs, gallops or rubs.  LUNGS:  Clear to auscultation bilaterally.  ABDOMEN:  Soft, nondistended with positive bowel sounds. Mildly tender to palpation.  EXTREMITIES:  No clubbing, cyanosis or edema bilaterally.  SKIN:  No rashes or petechiae.  NEURO:  Cranial nerves grossly intact.  No focal deficits.  PSYCH:  Alert and oriented x3.    LABORATORY    Lab Results   Component Value Date    WBC 22.58 (H) 10/27/2017    HGB 12.5 10/27/2017    HCT 41.4 10/27/2017    MCV 77.1 (L) 10/27/2017     (H) 10/27/2017    NEUTROABS 18.18 (H) 10/27/2017       Lab Results   Component Value Date     10/27/2017    K 3.9 10/27/2017     10/27/2017    CO2 21.9 (L) 10/27/2017    BUN 15 10/27/2017    CREATININE 1.61 (H) 10/27/2017    GLUCOSE 198 (H) 10/27/2017    CALCIUM 10.9 (H) 10/27/2017    AST 30 10/27/2017    ALT 20 10/27/2017    ALKPHOS 67 10/27/2017    BILITOT 0.5 10/27/2017    PROTEINTOT 8.2 (H) 10/27/2017    ALBUMIN 4.70 10/27/2017     CEA (06/13/2017): 1.4 ng/mL  CEA (03/14/2017): 2.0 ng/mL  CEA (01/13/2017): 1.4 ng/mL  CEA (12/06/2016): 2.1 ng/mL    IMAGING  CT abdomen and pelvis stone protocol (04/22/2016):  Impression: Large stone in the right ureter at the pelvic inlet causing marked hydronephrosis. There is also inflammatory change around the cecum of uncertain etiology.    CT chest with contrast (01/13/2017):  Impression: Unremarkable exam. No source for the patient's symptoms.    CT abdomen and pelvis with contrast (01/13/2017):  Impression: Nonspecific mesenteric mass measuring about 2 cm anterior to the 2nd portion of the duodenum not well seen on previous study. Previously identified right lower quadrant mesenteric lymph nodes are not appreciated today. This is of uncertain significance but could represent residual or recurrent disease versus unrelated finding.    CT chest with contrast (03/14/2017):  Impression:  Stable appearance of the chest.    CT abdomen and pelvis with contrast (03/14/2017):  Impression: 3.5 cm right upper quadrant mesenteric fluid collection with enhancing wall that could be infectious or neoplastic (intervally slightly increased in size compared to 01/13/2017).    CT chest with contrast (06/07/2017):  Impression: Stable appearance of the chest.    CT abdomen and pelvis with contrast (06/07/2017):  Impression: Right upper quadrant mesenteric predominantly cystic mass increasing in size from 2.6 to 3.9 cm.    CT chest with contrast (09/20/2017):  Impression:  1) Unremarkable exam. No source for the patient's symptoms.  2) Other incidental/non-acute findings as above [see report].    CT abdomen and pelvis with contrast (09/20/2017, compared to 06/07/2017):  Impression: Increased size of mesenteric mass. It was 4.5 cm and is now 6.3 cm.    PATHOLOGY  Segment of omentum and right colon (04/24/2016):  Portion of colon with attached terminal ileum with invasive moderately differentiated adenocarcinoma of colon with size 3 x 2.5 x 1.5 cm, with invasion through entire thickness of wall of colon to involve pericolonic fat with tumor cells in lymph-vascular spaces, margins of excision free of tumor and areas of diverticular formation. Metastatic adenocarcinoma in 8 out of 14 lymph nodes from pericolonic fat and six areas of discontinuous tumor deposit in pericolonic fat of specimen submitted as right colon. Essentially unremarkable vermiform appendix. Essentially unremarkable fat, no tumor seen, specimen submitted as omentum. [id number].    IMPRESSION AND PLAN  Ms. Jones is a 72 y.o., white female with:  1. Colon adenocarcinoma: Diagnosed with stage IIIC ([id number]) disease in April 2016, with full thickness invasion of the bowel wall into surrounding pericolonic fat and 8 out of 14 lymph nodes involved. The patient recovered well from the procedure and subsequently met with (through another office) oncology who  recommended adjuvant chemotherapy (with, presumably, a six month regimen). The patient ultimately refused this treatment. She was subsequently referred to our clinic to reestablish routine oncology follow up for this issue. I had a long discussion with the patient and her daughter in clinic at the time of her initial appointment (in early December 2016) regarding her diagnosis and its prognosis. They are aware that her chances of (an eventual) recurrence were significant, and routine monitoring with a combination of periodic symptom checks, CEA levels and imaging was recommended. While her most recent repeat serum CEA level remains WNL (1.4 ng/mL in early June 2017), the most recent repeat CT scans (from 09/20/2017, summarized above) have been increasingly concerning. The solitary, RUQ mesenteric mass has developed and been increasing slowly but steadily in size (currently to ~6.3 cm) since January 2017 and is now much more consistent with locally recurrent disease than with an artifact or scar tissue. Fortunately, this continues to be the only abnormal finding; and, despite her early dementia and other medical issues, as she was otherwise still doing overall well, we referred her back to her surgeon in early summer 2017 for further evaluation. They ultimately recommended against attempting a repeat resection on this probable local recurrence, stating that the limited longterm benefit likely did not justify the risks. However, this area is now likely the source of intermittent GI bleeding, which has recently become a more problematic issue. I had a long discussion with the patient and her daughter in clinic today regarding these developments. In short, given the patient's (fairly) rapidly progressing dementia, the risks of aggressive, palliative therapy on this area likely outweigh the limited potential benefits. I recommended a change in our goals of care to comfort measures/symptom management only, with the help  of home hospice; and they were in agreement with this plan. Referral placed today. We will see her back in clinic in six weeks or prn.  2. Reflux: Currently still improved since starting the omeprazole provided in early May 2017. Continue to monitor.  3. Protein calorie malnutrition: The patient's poor appetite is likely related to a combination of progressive dementia, decreased taste sensation and, increasingly, to issue #1. A referral to home hospice was placed today.  4. Pain: In the mid-back and possibly at least partially secondary to a localized recurrence of issue #1 (see above). Continue Norco 5/325 mg q8hr prn (which she has needed very infrequently). Further symptomatic management per home hospice.  The patient and her daughter were in agreement with these plans.    It is a pleasure to participate in Ms. Jones's care. Please do not hesitate to call with any questions or concerns that you may have.    A total of 30 minutes were spent coordinating this patient’s care in clinic today; 25 minutes of which were face-to-face with the patient and her daughter, reviewing her interim medical history and counseling on the current treatment and followup plan. All questions were answered to their satisfaction.    FOLLOW UP  Referral made to home hospice. Return to our clinic in 6 weeks or prn.        This document was electronically signed by DAVID Jaimes MD November 1, 2017 4:14 PM      CC: MD Brett Daiz MD, PhD   Gus Vasquez MD

## 2017-11-01 NOTE — PROGRESS NOTES
SS received referral for hospice services this date for pt.  SS spoke with patient and daughter Lynsey Meléndez in oncology during office visit.  Pt pleasantly confused.  Pt and daughter agreeable to hospice services.  Pt lives at home with caregiver three days a week and daughter assisting as needed.  Daughter states that caregiver services will be increased due to pt's need for someone to stay with her.  Pt's grandson Brock to assist as needed with pt's care.  SS contacted [facility] ([facility]) [phone] per Joan to give referral for services.  SS faxed [phone] face sheet, MD order, labs, mars, and office visit note.  SS will follow and assist as needed.